# Patient Record
Sex: FEMALE | Race: WHITE | NOT HISPANIC OR LATINO | Employment: FULL TIME | URBAN - NONMETROPOLITAN AREA
[De-identification: names, ages, dates, MRNs, and addresses within clinical notes are randomized per-mention and may not be internally consistent; named-entity substitution may affect disease eponyms.]

---

## 2024-06-08 ENCOUNTER — HOSPITAL ENCOUNTER (EMERGENCY)
Facility: HOSPITAL | Age: 24
Discharge: HOME | End: 2024-06-08
Attending: SURGERY
Payer: COMMERCIAL

## 2024-06-08 VITALS
RESPIRATION RATE: 18 BRPM | WEIGHT: 280 LBS | OXYGEN SATURATION: 84 % | SYSTOLIC BLOOD PRESSURE: 118 MMHG | BODY MASS INDEX: 51.53 KG/M2 | TEMPERATURE: 99 F | HEIGHT: 62 IN | HEART RATE: 95 BPM | DIASTOLIC BLOOD PRESSURE: 92 MMHG

## 2024-06-08 DIAGNOSIS — O20.0 THREATENED MISCARRIAGE IN EARLY PREGNANCY: Primary | ICD-10-CM

## 2024-06-08 DIAGNOSIS — E66.01 MORBID OBESITY: ICD-10-CM

## 2024-06-08 LAB
ANION GAP SERPL CALC-SCNC: 8 MEQ/L (ref 2–13)
B-HCG FREE SERPL-ACNC: 25.98 MIU/ML
BACTERIA #/AREA URNS AUTO: ABNORMAL /HPF
BASOPHILS # BLD AUTO: 0.04 X10(3)/MCL (ref 0.01–0.08)
BASOPHILS NFR BLD AUTO: 0.4 % (ref 0.1–1.2)
BILIRUB UR QL STRIP.AUTO: ABNORMAL
BUN SERPL-MCNC: 12 MG/DL (ref 7–20)
CALCIUM SERPL-MCNC: 9.2 MG/DL (ref 8.4–10.2)
CHLORIDE SERPL-SCNC: 108 MMOL/L (ref 98–110)
CLARITY UR: ABNORMAL
CO2 SERPL-SCNC: 22 MMOL/L (ref 21–32)
COLOR UR AUTO: YELLOW
CREAT SERPL-MCNC: 0.78 MG/DL (ref 0.66–1.25)
CREAT/UREA NIT SERPL: 15 (ref 12–20)
EOSINOPHIL # BLD AUTO: 0.28 X10(3)/MCL (ref 0.04–0.36)
EOSINOPHIL NFR BLD AUTO: 3 % (ref 0.7–7)
ERYTHROCYTE [DISTWIDTH] IN BLOOD BY AUTOMATED COUNT: 15.9 % (ref 11–14.5)
GFR SERPLBLD CREATININE-BSD FMLA CKD-EPI: >90 ML/MIN/1.73/M2
GLUCOSE SERPL-MCNC: 100 MG/DL (ref 70–115)
GLUCOSE UR QL STRIP: NEGATIVE
HCT VFR BLD AUTO: 36.3 % (ref 36–48)
HGB BLD-MCNC: 11.1 G/DL (ref 11.8–16)
HGB UR QL STRIP: ABNORMAL
IMM GRANULOCYTES # BLD AUTO: 0.02 X10(3)/MCL (ref 0–0.03)
IMM GRANULOCYTES NFR BLD AUTO: 0.2 % (ref 0–0.5)
KETONES UR QL STRIP: NEGATIVE
LEUKOCYTE ESTERASE UR QL STRIP: NEGATIVE
LYMPHOCYTES # BLD AUTO: 2.06 X10(3)/MCL (ref 1.16–3.74)
LYMPHOCYTES NFR BLD AUTO: 21.8 % (ref 20–55)
MCH RBC QN AUTO: 24 PG (ref 27–34)
MCHC RBC AUTO-ENTMCNC: 30.6 G/DL (ref 31–37)
MCV RBC AUTO: 78.6 FL (ref 79–99)
MONOCYTES # BLD AUTO: 0.48 X10(3)/MCL (ref 0.24–0.36)
MONOCYTES NFR BLD AUTO: 5.1 % (ref 4.7–12.5)
NEUTROPHILS # BLD AUTO: 6.56 X10(3)/MCL (ref 1.56–6.13)
NEUTROPHILS NFR BLD AUTO: 69.5 % (ref 37–73)
NITRITE UR QL STRIP: NEGATIVE
NRBC BLD AUTO-RTO: 0 %
PH UR STRIP: 6 [PH]
PLATELET # BLD AUTO: 360 X10(3)/MCL (ref 140–371)
PMV BLD AUTO: 9.9 FL (ref 9.4–12.4)
POTASSIUM SERPL-SCNC: 3.7 MMOL/L (ref 3.5–5.1)
PROT UR QL STRIP: 30
RBC # BLD AUTO: 4.62 X10(6)/MCL (ref 4–5.1)
RBC #/AREA URNS AUTO: >100 /HPF
SODIUM SERPL-SCNC: 138 MMOL/L (ref 136–145)
SP GR UR STRIP.AUTO: >=1.03 (ref 1–1.03)
SQUAMOUS #/AREA URNS AUTO: ABNORMAL /HPF
TSH SERPL-ACNC: 1.59 UIU/ML (ref 0.36–3.74)
UROBILINOGEN UR STRIP-ACNC: 0.2
WBC # SPEC AUTO: 9.44 X10(3)/MCL (ref 4–11.5)
WBC #/AREA URNS AUTO: ABNORMAL /HPF

## 2024-06-08 PROCEDURE — 85025 COMPLETE CBC W/AUTO DIFF WBC: CPT | Performed by: SURGERY

## 2024-06-08 PROCEDURE — 80048 BASIC METABOLIC PNL TOTAL CA: CPT | Performed by: SURGERY

## 2024-06-08 PROCEDURE — 87086 URINE CULTURE/COLONY COUNT: CPT | Performed by: SURGERY

## 2024-06-08 PROCEDURE — 81015 MICROSCOPIC EXAM OF URINE: CPT | Performed by: SURGERY

## 2024-06-08 PROCEDURE — 84702 CHORIONIC GONADOTROPIN TEST: CPT | Performed by: SURGERY

## 2024-06-08 PROCEDURE — 81003 URINALYSIS AUTO W/O SCOPE: CPT | Performed by: SURGERY

## 2024-06-08 PROCEDURE — 99283 EMERGENCY DEPT VISIT LOW MDM: CPT

## 2024-06-08 PROCEDURE — 84443 ASSAY THYROID STIM HORMONE: CPT | Performed by: SURGERY

## 2024-06-09 NOTE — DISCHARGE INSTRUCTIONS
FOLLOW UP WITH PERSONAL PHYSICIAN OR OB/GYN AS SOON AS POSSIBLE.  RETURN TO ER IF SYMPTOMS INCREASE OR IF NEW SYMPTOMS DEVELOP.

## 2024-06-09 NOTE — ED PROVIDER NOTES
Encounter Date: 2024       History     Chief Complaint   Patient presents with    Vaginal Bleeding     Pt reports that she has been having spotting and bleeding throughout the day. OB is women[facility]. Reports she got a positive pregnancy test on  and has had one every day since.      22 YO MORBIDLY OBESE WF  PRESENTING W/ ACUTE ONSET ATRAUMATIC VAGINAL BLEEDING @ APPROX 5 WEEKS GESTATION.  +QUESTIONABLE POCs PRODUCED.  NO PRIOR ULTRASOUND.  INTERMITTENT CRAMPING.  NO Hx/o HTN.  NO Hx/o DM.  NO NV.  NO SOB.  NO CP.        Review of patient's allergies indicates:  No Known Allergies  Past Medical History:   Diagnosis Date    Anxiety disorder, unspecified     Depression      History reviewed. No pertinent surgical history.  No family history on file.     Review of Systems   All other systems reviewed and are negative.      Physical Exam     Initial Vitals   BP Pulse Resp Temp SpO2   24   (!) 174/102 96 18 99 °F (37.2 °C) 99 %      MAP       --                Physical Exam    Nursing note and vitals reviewed.  Constitutional: She appears well-developed and well-nourished. No distress.   HENT:   Head: Normocephalic and atraumatic.   Right Ear: External ear normal.   Left Ear: External ear normal.   Nose: Nose normal.   Mouth/Throat: Oropharynx is clear and moist.   Eyes: Conjunctivae and EOM are normal. Pupils are equal, round, and reactive to light. Right eye exhibits no discharge. Left eye exhibits no discharge.   Neck: Neck supple. No thyromegaly present. No JVD present.   Normal range of motion.  Cardiovascular:  Normal rate, regular rhythm, normal heart sounds and intact distal pulses.     Exam reveals no gallop.       No murmur heard.  Pulmonary/Chest: Breath sounds normal. No respiratory distress. She has no wheezes. She has no rhonchi. She has no rales.   Abdominal: Abdomen is soft. Bowel sounds are normal. She exhibits  mass. She exhibits no distension. There is no abdominal tenderness. There is no rebound and no guarding.   Musculoskeletal:         General: Normal range of motion.      Cervical back: Normal range of motion and neck supple.     Neurological: She is alert and oriented to person, place, and time. She has normal strength. No cranial nerve deficit or sensory deficit. GCS score is 15. GCS eye subscore is 4. GCS verbal subscore is 5. GCS motor subscore is 6.   Skin: Skin is warm. Capillary refill takes less than 2 seconds.   Psychiatric: She has a normal mood and affect. Her behavior is normal. Judgment and thought content normal.         ED Course   Procedures  Labs Reviewed   URINALYSIS, REFLEX TO URINE CULTURE - Abnormal; Notable for the following components:       Result Value    Appearance, UA SL CLOUDY (*)     Protein, UA 30 (*)     Blood, UA Large (*)     Bilirubin, UA Small (*)     All other components within normal limits    Narrative:      URINE STABILITY IS 2 HOURS AT ROOM TEMP OR    SIX HOURS REFRIGERATED. PERFORMING TESTING ON    SPECIMENS GREATER THAN THIS AGE MAY AFFECT THE    FOLLOWING TESTS:    PH          SPECIFIC GRAVITY           BLOOD    CLARITY     BILIRUBIN               UROBILINOGEN   CBC WITH DIFFERENTIAL - Abnormal; Notable for the following components:    Hgb 11.1 (*)     MCV 78.6 (*)     MCH 24.0 (*)     MCHC 30.6 (*)     RDW 15.9 (*)     Neut # 6.56 (*)     Mono # 0.48 (*)     All other components within normal limits   URINALYSIS, MICROSCOPIC - Abnormal; Notable for the following components:    Bacteria, UA Few (*)     RBC, UA >100 (*)     WBC, UA 6-10 (*)     All other components within normal limits   CULTURE, URINE   HCG, QUANTITATIVE    Narrative:     Beta-HCG ref range weeks after implantation (mIU/mL):   3-4wks 9-130   4-5wks    5-6wks [id number]   6-7wks [id number]   7-12wks [id number]   12-16wks [id number]   16-28wks 1400-41216   20-41wks [phone]   CBC W/ AUTO  DIFFERENTIAL    Narrative:     The following orders were created for panel order CBC Auto Differential.  Procedure                               Abnormality         Status                     ---------                               -----------         ------                     CBC with Differential[[id number]]       Abnormal            Final result                 Please view results for these tests on the individual orders.   BASIC METABOLIC PANEL   TSH          Imaging Results    None          Medications - No data to display  Medical Decision Making             ED Course as of 06/08/24 2112   Sat Jun 08, 2024 2107 Hemoglobin(!): 11.1 [WV]   2108 MCV(!): 78.6 [WV]   2108 MCH(!): 24.0 [WV]   2108 MCHC(!): 30.6 [WV]   2108 Specific Gravity,UA: >=1.030 [WV]   2108 RBC, UA(!): >100 [WV]   2108 WBC, UA(!): 6-10 [WV]   2111 Bp 118/92 [WV]      ED Course User Index  [WV] Santiago Perez MD                           Clinical Impression:  Final diagnoses:  [O20.0] Threatened miscarriage in early pregnancy (Primary)  [E66.01] Morbid obesity                 Santiago Perez MD  06/08/24 2113

## 2024-06-11 LAB — BACTERIA UR CULT: NO GROWTH

## 2024-09-27 NOTE — PROGRESS NOTES
Chief Complaint:  irregular cycles    History of Present Illness:  Cait Virk is a 24 y.o. year old  presents for her new ob. Unsure of LMP. She is doing well.         Gyn History:  Menstrual History  Cycle: Yes (2024)  Menarche Age: 12 years  Flow Duration: 5  Flow: (!) Heavy  Interval: 28  Intermenstrual Bleeding: No  Dysmenorrhea: Yes  Dysmenorrhea Severity : Moderate    Menopause  Menopause Age: 1 years    Pap History  Last pap date: 23  Result: Normal  History of Abnormal Pap: No  HPV Vaccine Completed: No (0/3)    [place]  Sexually Active: Yes  Sexual Orientation: heterosexual  Postcoital Bleeding: No  Dyspareunia: No  STI History: No  Contraception: No    Breast History  Last Breast Imaging Date: No  History of Breast Biopsy: No        Review of Systems:  General/Constitutional: Chills denies. Fatigue/weakness denies. Fever denies. Night sweats denies. Hot flashes denies.   Respiratory: Cough denies. Hemoptysis denies. SOB denies. Sputum production denies. Wheezing denies.   Cardiovascular: Chest pain denies. Dizziness denies. Palpitations denies. Swelling in hands/feet denies.    Gastrointestinal: Abdominal pain denies. Blood in stool denies. Constipation denies. Diarrhea denies. Heartburn denies. Nausea denies. Vomiting denies.   Genitourinary: Incontinence denies. Blood in urine denies. Frequent urination denies. Painful urination denies.  Urinary urgency denies.  Nocturia denies.   Gynecologic: Irregular menses denies. Heavy bleeding denies. Painful menses denies. Vaginal discharge denies. Vaginal odor denies. Vaginal itching denies. Vaginal lesion denies.  Pelvic pain denies. Decreased libido denies. Vulvar lesion denies. Prolapse of genital organs denies. Painful intercourse denies. Postcoital bleeding denies.   Psychiatric: Depression denies. Anxiety denies.     OB History    Para Term  AB Living   2 0 0 0 1 0   SAB IAB Ectopic Multiple Live Births   1 0 0 0 0     "  # 1 - Date: 24, Sex: None, Weight: None, GA: 6w0d, Type: Spontaneous , Apgar1: None, Apgar5: None, Living: None, Birth Comments: None    # 2 - Date: None, Sex: None, Weight: None, GA: None, Type: None, Apgar1: None, Apgar5: None, Living: None, Birth Comments: None    Past Medical History:   Diagnosis Date    Anemia     Anxiety disorder, unspecified     Depression      No current outpatient medications on file.    Review of patient's allergies indicates:  No Known Allergies    History reviewed. No pertinent surgical history.    Family History   Problem Relation Name Age of Onset    Heart failure Maternal Grandfather Wagner Quiñones         Had a heart attack in  still alive    Hypertension Father Quinn Virk     Migraines Mother Dinesh Quiñones         Constant migrianes on medication for them    Rashes / Skin problems Mother Dinesh Quiñones         Eczema    Thyroid disease Mother Dinesh Quiñones     Migraines Sister Viji Campbell         Constant migraines on medication for them    Heart failure Paternal Uncle Gabriel Virk         Had 3 heart attacks and  in  from the last one    Breast cancer Neg Hx      Colon cancer Neg Hx      Ovarian cancer Neg Hx      Uterine cancer Neg Hx       Social History     Socioeconomic History    Marital status: Single   Tobacco Use    Smoking status: Never     Passive exposure: Never    Smokeless tobacco: Never   Substance and Sexual Activity    Alcohol use: Never    Drug use: Never    Sexual activity: Yes     Partners: Male     Birth control/protection: None     Comment: Only ever been with my current partner. Previous depo shot       Physical Exam:  /84 (BP Location: Right arm, Patient Position: Sitting)   Temp 98.8 °F (37.1 °C) (Temporal)   Ht 5' 2" (1.575 m)   Wt (!) 138.7 kg (305 lb 12.8 oz)   LMP 2024 (Exact Date)   BMI 55.93 kg/m²       Chaperone present.       Constitutional: General appearance: healthy, well-nourished and " well-developed  Psychiatric: Orientation to time, place and person. Normal mood and affect and active, alert  Abdomen: Auscultation/Inspection/Palpation: NT, gravid    Female Genitalia:      Vulva: no masss, atrophy or lesions     Bladder/Urethra: no urethral discharge or mass, normal meatus, bladder non-distended     Vagina: no tenderness, erythema, cystocele, rectocele, abnormal vaginal discharge, or vesicle(s) or ulcers     Cervix: no discharge or cervical motion tenderness and grossly normal     Uterus: normal size and shape and midline, non-tender, and no uterine prolapse     Adnexa/Parametria: no parametrial tenderness or mass, no adnexal tenderness or ovarian mass         Assessment/Plan:  1. Anxiety  Educated  Counseling information given for Elaine Ventura [facility] [phone]  HI & SI precautions  Call if desires medication    2. BMI 50.0-59.9, adult  Obesity, especially class III obesity (BMI >= 40) is associated with numerous adverse pregnancy outcomes. These include but are not limited to miscarriage, poor ultrasound visualization, increase in congenital malformations (especially NTD's ),  birth, ascending infections, gestational diabetes, hypertensive diseases of pregnancy, macrosomia, shoulder dystocia, cerebral palsy and surgical complications such as wound infections, dehiscence and thrombosis. Macrosomia and the incidence of intrapartum complications related to macrosomia, such as shoulder dystocia, malpresentation, hemorrhage, and fourth degree laceration are also increased in obese gravidas. Obese women also have a high rate of  delivery and are more likely to have surgical, anesthetic, or postpartum complications.  birth in obese gravidas is primarily associated with obesity-related medical and  complications, rather than an intrinsic predisposition to spontaneous  labor.     Recommendations:  Recommended gestational weight gain of 11-20 pounds. Consider  dietary counseling.  Initiate low dose aspirin 81 mg daily for preeclampsia risk reduction at 12-16 weeks  Patients should receive adequate counseling regarding risk factors and possible difficulties with accurate labor monitoring,  delivery operative complications, and anesthesia.   With the increased risk of gestational diabetes, early 1st trimester screening is often advocated, however, there are no data demonstrating the effectiveness of this early screening in improving pregnancy outcomes.   Fetal growth assessment via fundal height monitoring is difficult in the setting of obesity. When the fundal height cannot be reliably followed or BMI >= 40, ultrasound evaluation of fetal growth is recommended at 32 weeks or sooner if other co-morbidities or indications exist.   Patients with a BMI of 40 or greater are at the highest risk for adverse pregnancy outcome, including stillbirth. Therefore, weekly  surveillance is recommended beginning at 34 weeks until delivery; other co-morbidities may require more frequent testing.  Consider Lovenox 40 mg BID for VTE prophylaxis while admitted to the hospital (antepartum or postpartum) if BMI >= 40.      1hr glucose, A1c    3. 10 weeks gestation of pregnancy  We discussed diet/supplements and modifiable risk factors.     Patients advised to continue moderate physical activity.       Patients will report unusual headaches, visual disturbances, pelvic pain or cramping, vaginal bleeding, rupture of membranes, extreme swelling in hands or face, diminished urine volume, any prolonged illness or infection, or persistent symptoms of labor.       +FHT   GA: unsure of LMP; 10w5d by US  ROBINA: 2025 by US  Pap w/ gc/cz/tv  PNL, PNV, folic acid  CMP, glucose 1hr, a1c  Desires Starr  Follow up 4 weeks      Educated on compliance of future appts  No future appointments.      This note was transcribed by Brisa Lundberg MA. There may be transcription errors as a result,  however minimal. I agree with transcription and every effort has been made to ensure accuracy of transcription, but any obvious errors or omissions should be clarified with the author of the document.

## 2024-09-30 ENCOUNTER — OFFICE VISIT (OUTPATIENT)
Dept: OBSTETRICS AND GYNECOLOGY | Facility: CLINIC | Age: 24
End: 2024-09-30
Payer: COMMERCIAL

## 2024-09-30 VITALS
HEIGHT: 62 IN | TEMPERATURE: 99 F | BODY MASS INDEX: 53.92 KG/M2 | SYSTOLIC BLOOD PRESSURE: 120 MMHG | DIASTOLIC BLOOD PRESSURE: 84 MMHG | WEIGHT: 293 LBS

## 2024-09-30 DIAGNOSIS — F41.9 ANXIETY: Primary | ICD-10-CM

## 2024-09-30 DIAGNOSIS — N91.5 OLIGOMENORRHEA, UNSPECIFIED TYPE: ICD-10-CM

## 2024-09-30 DIAGNOSIS — Z34.90 PREGNANCY, UNSPECIFIED GESTATIONAL AGE: ICD-10-CM

## 2024-09-30 DIAGNOSIS — Z3A.10 10 WEEKS GESTATION OF PREGNANCY: ICD-10-CM

## 2024-09-30 LAB
B-HCG UR QL: POSITIVE
CTP QC/QA: YES

## 2024-09-30 PROCEDURE — 87591 N.GONORRHOEAE DNA AMP PROB: CPT | Performed by: OBSTETRICS & GYNECOLOGY

## 2024-09-30 PROCEDURE — 87661 TRICHOMONAS VAGINALIS AMPLIF: CPT | Performed by: OBSTETRICS & GYNECOLOGY

## 2024-09-30 PROCEDURE — 87491 CHLMYD TRACH DNA AMP PROBE: CPT | Performed by: OBSTETRICS & GYNECOLOGY

## 2024-10-02 ENCOUNTER — LAB VISIT (OUTPATIENT)
Dept: LAB | Facility: HOSPITAL | Age: 24
End: 2024-10-02
Attending: OBSTETRICS & GYNECOLOGY
Payer: COMMERCIAL

## 2024-10-02 DIAGNOSIS — Z3A.10 10 WEEKS GESTATION OF PREGNANCY: ICD-10-CM

## 2024-10-02 DIAGNOSIS — Z34.90 PREGNANCY, UNSPECIFIED GESTATIONAL AGE: ICD-10-CM

## 2024-10-02 LAB
ALBUMIN SERPL-MCNC: 4.1 G/DL (ref 3.4–5)
ALBUMIN/GLOB SERPL: 1.4 RATIO
ALP SERPL-CCNC: 84 UNIT/L (ref 50–144)
ALT SERPL-CCNC: 16 UNIT/L (ref 1–45)
ANION GAP SERPL CALC-SCNC: 10 MEQ/L (ref 2–13)
AST SERPL-CCNC: 18 UNIT/L (ref 14–36)
BILIRUB SERPL-MCNC: 0.2 MG/DL (ref 0–1)
BUN SERPL-MCNC: 8 MG/DL (ref 7–20)
CALCIUM SERPL-MCNC: 10 MG/DL (ref 8.4–10.2)
CHLORIDE SERPL-SCNC: 105 MMOL/L (ref 98–110)
CO2 SERPL-SCNC: 21 MMOL/L (ref 21–32)
CREAT SERPL-MCNC: 0.59 MG/DL (ref 0.66–1.25)
CREAT/UREA NIT SERPL: 14 (ref 12–20)
ERYTHROCYTE [DISTWIDTH] IN BLOOD BY AUTOMATED COUNT: 17 % (ref 11–14.5)
EST. AVERAGE GLUCOSE BLD GHB EST-MCNC: 105.4 MG/DL (ref 70–115)
GFR SERPLBLD CREATININE-BSD FMLA CKD-EPI: >90 ML/MIN/1.73/M2
GLOBULIN SER-MCNC: 3 GM/DL (ref 2–3.9)
GLUCOSE SERPL-MCNC: 80 MG/DL (ref 70–115)
GROUP & RH: NORMAL
HBA1C MFR BLD: 5.3 % (ref 4–6)
HBV SURFACE AG SERPL QL IA: NEGATIVE
HBV SURFACE AG SERPL QL IA: NORMAL
HCT VFR BLD AUTO: 33.9 % (ref 36–48)
HGB BLD-MCNC: 10.5 G/DL (ref 11.8–16)
INDIRECT COOMBS: NORMAL
MCH RBC QN AUTO: 23.4 PG (ref 27–34)
MCHC RBC AUTO-ENTMCNC: 31 G/DL (ref 31–37)
MCV RBC AUTO: 75.7 FL (ref 79–99)
NRBC BLD AUTO-RTO: 0 %
PLATELET # BLD AUTO: 318 X10(3)/MCL (ref 140–371)
PMV BLD AUTO: 10.6 FL (ref 9.4–12.4)
POTASSIUM SERPL-SCNC: 4.3 MMOL/L (ref 3.5–5.1)
PROT SERPL-MCNC: 7.1 GM/DL (ref 6.3–8.2)
RBC # BLD AUTO: 4.48 X10(6)/MCL (ref 4–5.1)
RUBELLA AB, IGG (OLG): 25.8 IU/ML
SODIUM SERPL-SCNC: 136 MMOL/L (ref 136–145)
SPECIMEN OUTDATE: NORMAL
WBC # BLD AUTO: 10.23 X10(3)/MCL (ref 4–11.5)

## 2024-10-02 PROCEDURE — 86803 HEPATITIS C AB TEST: CPT

## 2024-10-02 PROCEDURE — 83036 HEMOGLOBIN GLYCOSYLATED A1C: CPT

## 2024-10-02 PROCEDURE — 36415 COLL VENOUS BLD VENIPUNCTURE: CPT

## 2024-10-02 PROCEDURE — 83020 HEMOGLOBIN ELECTROPHORESIS: CPT

## 2024-10-02 PROCEDURE — 80053 COMPREHEN METABOLIC PANEL: CPT

## 2024-10-02 PROCEDURE — 87340 HEPATITIS B SURFACE AG IA: CPT

## 2024-10-02 PROCEDURE — 86901 BLOOD TYPING SEROLOGIC RH(D): CPT | Performed by: OBSTETRICS & GYNECOLOGY

## 2024-10-02 PROCEDURE — 86900 BLOOD TYPING SEROLOGIC ABO: CPT | Performed by: OBSTETRICS & GYNECOLOGY

## 2024-10-02 PROCEDURE — 86780 TREPONEMA PALLIDUM: CPT

## 2024-10-02 PROCEDURE — 86762 RUBELLA ANTIBODY: CPT

## 2024-10-02 PROCEDURE — 86787 VARICELLA-ZOSTER ANTIBODY: CPT

## 2024-10-02 PROCEDURE — 87389 HIV-1 AG W/HIV-1&-2 AB AG IA: CPT

## 2024-10-02 PROCEDURE — 86850 RBC ANTIBODY SCREEN: CPT | Performed by: OBSTETRICS & GYNECOLOGY

## 2024-10-02 PROCEDURE — 85027 COMPLETE CBC AUTOMATED: CPT

## 2024-10-03 LAB
HCV AB SERPL QL IA: NONREACTIVE
HIV 1+2 AB+HIV1 P24 AG SERPL QL IA: NONREACTIVE
T PALLIDUM AB SER QL: NONREACTIVE
VZV IGG SER IA-ACNC: 0.7
VZV IGG SER QL IA: NEGATIVE

## 2024-10-04 LAB
HGB A MFR BLD ELPH: 97.7 % (ref 95.8–98)
HGB A2 MFR BLD ELPH: 2.3 % (ref 2–3.3)
HGB F MFR BLD ELPH: 0 % (ref 0–0.9)
HGB FRACT BLD ELPH-IMP: NORMAL
M HPLC HB VARIANT, B: NORMAL

## 2024-10-11 ENCOUNTER — LAB VISIT (OUTPATIENT)
Dept: LAB | Facility: HOSPITAL | Age: 24
End: 2024-10-11
Attending: OBSTETRICS & GYNECOLOGY
Payer: COMMERCIAL

## 2024-10-11 DIAGNOSIS — Z3A.10 10 WEEKS GESTATION OF PREGNANCY: ICD-10-CM

## 2024-10-11 LAB — GLUCOSE 1H P 100 G GLC PO SERPL-MCNC: 96 MG/DL (ref 100–180)

## 2024-10-11 PROCEDURE — 82950 GLUCOSE TEST: CPT

## 2024-10-28 ENCOUNTER — ROUTINE PRENATAL (OUTPATIENT)
Dept: OBSTETRICS AND GYNECOLOGY | Facility: CLINIC | Age: 24
End: 2024-10-28
Payer: COMMERCIAL

## 2024-10-28 VITALS — SYSTOLIC BLOOD PRESSURE: 124 MMHG | BODY MASS INDEX: 56 KG/M2 | DIASTOLIC BLOOD PRESSURE: 76 MMHG | WEIGHT: 293 LBS

## 2024-10-28 DIAGNOSIS — Z3A.14 14 WEEKS GESTATION OF PREGNANCY: ICD-10-CM

## 2024-10-28 DIAGNOSIS — F41.9 ANXIETY: Primary | ICD-10-CM

## 2024-10-28 LAB
BILIRUB UR QL STRIP: NORMAL
GLUCOSE UR QL STRIP: NEGATIVE
KETONES UR QL STRIP: NORMAL
LEUKOCYTE ESTERASE UR QL STRIP: NEGATIVE
PH, POC UA: NORMAL
POC BLOOD, URINE: NORMAL
POC NITRATES, URINE: NEGATIVE
PROT UR QL STRIP: NEGATIVE
SP GR UR STRIP: NORMAL (ref 1–1.03)
UROBILINOGEN UR STRIP-ACNC: NORMAL (ref 0.3–2.2)

## 2024-10-28 RX ORDER — ASPIRIN 81 MG/1
81 TABLET ORAL DAILY
Qty: 150 TABLET | Refills: 1 | Status: SHIPPED | OUTPATIENT
Start: 2024-10-28 | End: 2025-10-28

## 2024-11-06 ENCOUNTER — PATIENT MESSAGE (OUTPATIENT)
Dept: OTHER | Facility: OTHER | Age: 24
End: 2024-11-06
Payer: COMMERCIAL

## 2024-11-06 ENCOUNTER — TELEPHONE (OUTPATIENT)
Dept: OBSTETRICS AND GYNECOLOGY | Facility: CLINIC | Age: 24
End: 2024-11-06
Payer: COMMERCIAL

## 2024-11-06 NOTE — PROGRESS NOTES
Chief Complaint:  Routine Prenatal Visit    History of Present Illness:  Cait Virk is a 24 y.o. year old  at 16w1d presents for her ob exam. C/o increased anxiety and crying spells due to work. PMH of anxiety. Denies HI/SI. Able to care for self. Great family support.         Review of Systems:  General/Constitutional: Fever denies. Headache denies.     Skin: Rash denies.   Respiratory: Cough denies. SOB denies. Wheezing denies .   Cardiovascular: Chest pain denies. Dizziness denies. Palpitations denies. Swelling in hands/feet denies.    Gastrointestinal: Abdominal pain denies. Constipation denies. Diarrhea denies. Heartburn denies. Nausea denies. Vomiting denies.  Genitourinary: Painful urination denies.   Gynecologic: Vaginal bleeding denies. Vaginal discharge Normal. Leaking amniotic fluid denies.  Contractions denies.  Psychiatric: Depressed mood denies. Suicidal thoughts denies.       Current Outpatient Medications:     aspirin (ECOTRIN) 81 MG EC tablet, Take 1 tablet (81 mg total) by mouth once daily., Disp: 150 tablet, Rfl: 1    prenatal no115/iron/folic acid (PRENATAL 19 ORAL), Take by mouth., Disp: , Rfl:     busPIRone (BUSPAR) 7.5 MG tablet, Take 1 tablet (7.5 mg total) by mouth 2 (two) times a day., Disp: 60 tablet, Rfl: 2    hydrOXYzine pamoate (VISTARIL) 50 MG Cap, Take 1 capsule (50 mg total) by mouth every 6 (six) hours as needed (anxiety)., Disp: 30 capsule, Rfl: 1    Physical Exam:  /84   Wt (!) 137.4 kg (303 lb)   LMP 2024 (Exact Date)   BMI 55.42 kg/m²     Constitutional: General appearance: healthy, well-nourished and well-developed   Psychiatric:  Orientation to time, place and person. Normal affect and active, alert   Abdomen: Auscultation/Inspection/Palpation: Gravid. No tenderness or masses. Soft, nondistended   Extremities: no CCE    FHT: 150      Assessment/Plan  1. Anxiety in pregnancy  Educated  Counseling information given for Elaine Ventura L[facility]  [phone]  HI & SI precautions  Desires medication  Rx: Buspar 7.5 mg BID, Vistaril 50 mg q4-6hrs PRN for anxiety  Follow up 2 weeks    2. Anemia affecting pregnancy in second trimester  Anemia is defined by the WHO as hemoglobin less than 11 g/dL with the equivalent of lesser hematocrit of 33%. It is present in the about 30% of reproductive age females, and 40% of print individuals. The 2 most common causes of anemia in pregnancy are physiologic anemia of pregnancy and  iron-deficiency. Much less common causes of anemia include hemoglobinopathies (sickle cell, thalassemia), RBC membrane disorders (hereditary spherocytosis and had hereditary Elliptocytosis), and acquired anemias (folate deficiency, vitamin B12 deficiency, other vitamin deficiencies, autoimmune hemolytic anemia, hypothyroidism and chronic kidney disease). Anemia in pregnancy is associated with the twofold increase in  delivery and 3 fold increase in delivering a low birth weight baby.     Educated    Ferrous sulfate 325 mg twice daily, vitamin-C 250 mg twice daily, and folic acid 1 mg daily    Lab Results   Component Value Date    HGB 10.8 (L) 2024    HGB 10.5 (L) 10/02/2024    HCT 34.3 (L) 2024    HCT 33.9 (L) 10/02/2024     -     CBC Without Differential; Future; Expected date: 2024  -     Ferritin; Future; Expected date: 2024  -     Iron and TIBC; Future; Expected date: 2024    3. BMI 50.0-59.9, adult  Patient educated on risks and complications of obesity and pregnancy including but not limited to first trimester miscarriage recurrent miscarriages, congenital anomalies, HTN disorders, gestational diabetes, macrosomia, PTL&D, higher risk of fetal demise in-utero and higher risk of CS (and wound complication including infection breakdown) with obesity.     The [place] preventative task force placed the following recommendations in 2016 which ACOG supports regarding utilization of low dose 81mg ASA starting at 14  weeks and continuing through pregnancy in high risk pregnant women. The utilization of this has been shown to reduce the risk for preeclampsia by 24% in clinical trials and reduce the risk of  birth by 14% and IUGR about 20%.      Cont 81mg ASA     4. 16 weeks gestation of pregnancy  Instructed on weight gain, healthy exercise, vitamins, avoidance of drugs tobacco and alcohol. Pain, fever, bleeding precautions.        Discussed with patient travel precautions.  CBC, iron panel  MSAFP, keep MFM appt  Follow up 2 weeks for Buspar, Vistaril       Educated on compliance of future appts  Future Appointments   Date Time Provider Department Center   2024  8:00 AM Geri Noriega MD [facility]   2024 11:00 AM [facility]   2024 11:40 AM Regine Bhatt MD [facility] [place]       This note was transcribed by Brisa Lundberg MA. There may be transcription errors as a result, however minimal. I agree with transcription and every effort has been made to ensure accuracy of transcription, but any obvious errors or omissions should be clarified with the author of the document.

## 2024-11-06 NOTE — TELEPHONE ENCOUNTER
Patient called around 1400 c/o decrease fetal movement. Patient is currently 16.0 weeks. Counseled patient on fetal movement is not felt or is only occasional at 16 weeks. Patient insist she has felt movement every day and today she is not feeling movement. Spoke with Dr. Noriega.  Dr. Noriega offered an appointment for tomorrow.  Patient also advised to go to ER if she feels this is emergent.  Patient voiced understanding and agrees with plan of care.

## 2024-11-07 ENCOUNTER — ROUTINE PRENATAL (OUTPATIENT)
Dept: OBSTETRICS AND GYNECOLOGY | Facility: CLINIC | Age: 24
End: 2024-11-07
Payer: COMMERCIAL

## 2024-11-07 ENCOUNTER — LAB VISIT (OUTPATIENT)
Dept: LAB | Facility: HOSPITAL | Age: 24
End: 2024-11-07
Attending: OBSTETRICS & GYNECOLOGY
Payer: COMMERCIAL

## 2024-11-07 VITALS — DIASTOLIC BLOOD PRESSURE: 84 MMHG | WEIGHT: 293 LBS | BODY MASS INDEX: 55.42 KG/M2 | SYSTOLIC BLOOD PRESSURE: 126 MMHG

## 2024-11-07 DIAGNOSIS — O99.012 ANEMIA AFFECTING PREGNANCY IN SECOND TRIMESTER: ICD-10-CM

## 2024-11-07 DIAGNOSIS — O99.212 MATERNAL OBESITY SYNDROME IN SECOND TRIMESTER: ICD-10-CM

## 2024-11-07 DIAGNOSIS — E66.01 MORBID OBESITY WITH BMI OF 50.0-59.9, ADULT: Primary | ICD-10-CM

## 2024-11-07 DIAGNOSIS — Z3A.16 16 WEEKS GESTATION OF PREGNANCY: ICD-10-CM

## 2024-11-07 DIAGNOSIS — O99.340 ANXIETY IN PREGNANCY, ANTEPARTUM: ICD-10-CM

## 2024-11-07 DIAGNOSIS — F41.9 ANXIETY IN PREGNANCY, ANTEPARTUM: ICD-10-CM

## 2024-11-07 LAB
BILIRUB UR QL STRIP: NORMAL
ERYTHROCYTE [DISTWIDTH] IN BLOOD BY AUTOMATED COUNT: 18.3 % (ref 11–14.5)
FERRITIN SERPL-MCNC: 5.98 NG/ML (ref 6.24–264)
GLUCOSE UR QL STRIP: NEGATIVE
HCT VFR BLD AUTO: 34.3 % (ref 36–48)
HGB BLD-MCNC: 10.8 G/DL (ref 11.8–16)
KETONES UR QL STRIP: NORMAL
LEUKOCYTE ESTERASE UR QL STRIP: NEGATIVE
MCH RBC QN AUTO: 23.8 PG (ref 27–34)
MCHC RBC AUTO-ENTMCNC: 31.5 G/DL (ref 31–37)
MCV RBC AUTO: 75.7 FL (ref 79–99)
NRBC BLD AUTO-RTO: 0 %
PH, POC UA: NORMAL
PLATELET # BLD AUTO: 295 X10(3)/MCL (ref 140–371)
PMV BLD AUTO: 10.5 FL (ref 9.4–12.4)
POC BLOOD, URINE: NORMAL
POC NITRATES, URINE: NEGATIVE
PROT UR QL STRIP: NEGATIVE
RBC # BLD AUTO: 4.53 X10(6)/MCL (ref 4–5.1)
SP GR UR STRIP: NORMAL (ref 1–1.03)
UROBILINOGEN UR STRIP-ACNC: NORMAL (ref 0.3–2.2)
WBC # BLD AUTO: 10.59 X10(3)/MCL (ref 4–11.5)

## 2024-11-07 PROCEDURE — 82105 ALPHA-FETOPROTEIN SERUM: CPT

## 2024-11-07 PROCEDURE — 82728 ASSAY OF FERRITIN: CPT

## 2024-11-07 PROCEDURE — 85027 COMPLETE CBC AUTOMATED: CPT

## 2024-11-07 PROCEDURE — 83550 IRON BINDING TEST: CPT

## 2024-11-07 PROCEDURE — 36415 COLL VENOUS BLD VENIPUNCTURE: CPT

## 2024-11-07 RX ORDER — HYDROXYZINE PAMOATE 50 MG/1
50 CAPSULE ORAL EVERY 6 HOURS PRN
Qty: 30 CAPSULE | Refills: 1 | Status: SHIPPED | OUTPATIENT
Start: 2024-11-07

## 2024-11-07 RX ORDER — BUSPIRONE HYDROCHLORIDE 7.5 MG/1
7.5 TABLET ORAL 2 TIMES DAILY
Qty: 60 TABLET | Refills: 2 | Status: SHIPPED | OUTPATIENT
Start: 2024-11-07 | End: 2025-11-07

## 2024-11-08 LAB
IRON SATN MFR SERPL: 17 % (ref 20–50)
IRON SERPL-MCNC: 50 UG/DL (ref 50–170)
TIBC SERPL-MCNC: 250 UG/DL (ref 70–310)
TIBC SERPL-MCNC: 300 UG/DL (ref 250–450)
TRANSFERRIN SERPL-MCNC: 267 MG/DL (ref 180–382)

## 2024-11-11 LAB — MAYO GENERIC ORDERABLE RESULT: NORMAL

## 2024-11-13 ENCOUNTER — PATIENT MESSAGE (OUTPATIENT)
Dept: OTHER | Facility: OTHER | Age: 24
End: 2024-11-13
Payer: COMMERCIAL

## 2024-11-25 NOTE — PROGRESS NOTES
Chief Complaint:  Routine Prenatal Visit    History of Present Illness:  Cait Virk is a 24 y.o. year old  at 18w6d presents to follow up initiation of Buspar 7.5 mg BID and Vistaril 25 mg PRN on 24 secondary to anxiety. Reports improvement however still has baseline anxiety. Denies HI/SI. Great family support.        Review of Systems:  General/Constitutional: Fever denies. Headache denies.     Skin: Rash denies.   Respiratory: Cough denies. SOB denies. Wheezing denies .   Cardiovascular: Chest pain denies. Dizziness denies. Palpitations denies. Swelling in hands/feet denies.    Gastrointestinal: Abdominal pain denies. Constipation denies. Diarrhea denies. Heartburn denies. Nausea denies. Vomiting denies.  Genitourinary: Painful urination denies.   Gynecologic: Vaginal bleeding denies. Vaginal discharge Normal. Leaking amniotic fluid denies.  Contractions denies.  Psychiatric: Depressed mood denies. Suicidal thoughts denies.       Current Outpatient Medications:     aspirin (ECOTRIN) 81 MG EC tablet, Take 1 tablet (81 mg total) by mouth once daily., Disp: 150 tablet, Rfl: 1    hydrOXYzine pamoate (VISTARIL) 50 MG Cap, Take 1 capsule (50 mg total) by mouth every 6 (six) hours as needed (anxiety)., Disp: 30 capsule, Rfl: 1    prenatal no115/iron/folic acid (PRENATAL 19 ORAL), Take by mouth., Disp: , Rfl:     busPIRone (BUSPAR) 10 MG tablet, Take 1 tablet (10 mg total) by mouth 2 (two) times daily., Disp: 60 tablet, Rfl: 1    Physical Exam:  /72   Wt (!) 139 kg (306 lb 6.4 oz)   LMP 2024 (Exact Date)   BMI 56.04 kg/m²     Constitutional: General appearance: healthy, well-nourished and well-developed   Psychiatric:  Orientation to time, place and person. Normal mood and affect and active, alert   Abdomen: Auscultation/Inspection/Palpation: Gravid. No tenderness or masses. Soft, nondistended   Extremities: no CCE    FHT: 130      Assessment/Plan  1. Anxiety in pregnancy,  antepartum  Educated  Counseling information given for Elaine Ventura [facility] [phone]  HI & SI precautions  Improvement however still has symptoms  Increase Buspar to 10 mg BID  Follow up 3 weeks    2. Anemia affecting pregnancy in second trimester  Anemia is defined by the WHO as hemoglobin less than 11 g/dL with the equivalent of lesser hematocrit of 33%. It is present in the about 30% of reproductive age females, and 40% of print individuals. The 2 most common causes of anemia in pregnancy are physiologic anemia of pregnancy and  iron-deficiency. Much less common causes of anemia include hemoglobinopathies (sickle cell, thalassemia), RBC membrane disorders (hereditary spherocytosis and had hereditary Elliptocytosis), and acquired anemias (folate deficiency, vitamin B12 deficiency, other vitamin deficiencies, autoimmune hemolytic anemia, hypothyroidism and chronic kidney disease). Anemia in pregnancy is associated with the twofold increase in  delivery and 3 fold increase in delivering a low birth weight baby.     Educated    Ferrous sulfate 325 mg twice daily, vitamin-C 250 mg twice daily, and folic acid 1 mg daily    24  Iron: 50  TIBC: 300  Ferritin: 5.98    Lab Results   Component Value Date    HGB 10.8 (L) 2024    HGB 10.5 (L) 10/02/2024    HCT 34.3 (L) 2024    HCT 33.9 (L) 10/02/2024       3. BMI 50.0-59.9, adult  Patient educated on risks and complications of obesity and pregnancy including but not limited to first trimester miscarriage recurrent miscarriages, congenital anomalies, HTN disorders, gestational diabetes, macrosomia, PTL&D, higher risk of fetal demise in-utero and higher risk of CS (and wound complication including infection breakdown) with obesity.     The [place] preventative task force placed the following recommendations in 2016 which ACOG supports regarding utilization of low dose 81mg ASA starting at 14 weeks and continuing through pregnancy in high risk pregnant  women. The utilization of this has been shown to reduce the risk for preeclampsia by 24% in clinical trials and reduce the risk of  birth by 14% and IUGR about 20%.      Cont 81mg ASA     4. 18 weeks gestation of pregnancy  Instructed on weight gain, healthy exercise, vitamins, avoidance of drugs tobacco and alcohol. Pain, fever, bleeding precautions.        Discussed with patient travel precautions.  Keep [facility] appt  Follow up 3 weeks for Buspar increase       Educated on compliance of future appts  Future Appointments   Date Time Provider Department Center   2024 11:00 AM ROOM 2, [facility] [place] [facility] [place]   2024 11:40 AM Regine Bhatt MD [facility]       This note was transcribed by Brisa uLndberg MA. There may be transcription errors as a result, however minimal. I agree with transcription and every effort has been made to ensure accuracy of transcription, but any obvious errors or omissions should be clarified with the author of the document.

## 2024-11-26 ENCOUNTER — ROUTINE PRENATAL (OUTPATIENT)
Dept: OBSTETRICS AND GYNECOLOGY | Facility: CLINIC | Age: 24
End: 2024-11-26
Payer: COMMERCIAL

## 2024-11-26 VITALS — BODY MASS INDEX: 56.04 KG/M2 | SYSTOLIC BLOOD PRESSURE: 124 MMHG | DIASTOLIC BLOOD PRESSURE: 72 MMHG | WEIGHT: 293 LBS

## 2024-11-26 DIAGNOSIS — O99.012 ANEMIA AFFECTING PREGNANCY IN SECOND TRIMESTER: ICD-10-CM

## 2024-11-26 DIAGNOSIS — Z3A.18 18 WEEKS GESTATION OF PREGNANCY: ICD-10-CM

## 2024-11-26 DIAGNOSIS — O99.340 ANXIETY IN PREGNANCY, ANTEPARTUM: Primary | ICD-10-CM

## 2024-11-26 DIAGNOSIS — F41.9 ANXIETY IN PREGNANCY, ANTEPARTUM: Primary | ICD-10-CM

## 2024-11-26 RX ORDER — BUSPIRONE HYDROCHLORIDE 10 MG/1
10 TABLET ORAL 2 TIMES DAILY
Qty: 60 TABLET | Refills: 1 | Status: SHIPPED | OUTPATIENT
Start: 2024-11-26 | End: 2025-11-26

## 2024-12-04 ENCOUNTER — PATIENT MESSAGE (OUTPATIENT)
Dept: OTHER | Facility: OTHER | Age: 24
End: 2024-12-04
Payer: COMMERCIAL

## 2024-12-11 DIAGNOSIS — E66.01 MORBID OBESITY WITH BMI OF 50.0-59.9, ADULT: Primary | ICD-10-CM

## 2024-12-11 DIAGNOSIS — O99.212 MATERNAL OBESITY SYNDROME IN SECOND TRIMESTER: ICD-10-CM

## 2024-12-13 NOTE — PROGRESS NOTES
"Chief Complaint:  Routine Prenatal Visit (21.6 weeks)    History of Present Illness:  Cait Virk is a 24 y.o. year old  at 21w6d presents to follow up Buspar increase to 10 mg BID. States crying spells have increased with the 10 mg, desires to go back to the 7.5 mg. Her mood is overall stable. Denies HI/SI. Able to care for self.         Review of Systems:  General/Constitutional: Fever denies. Headache denies.     Skin: Rash denies.   Respiratory: Cough denies. SOB denies. Wheezing denies .   Cardiovascular: Chest pain denies. Dizziness denies. Palpitations denies. Swelling in hands/feet denies.    Gastrointestinal: Abdominal pain denies. Constipation denies. Diarrhea denies. Heartburn denies. Nausea denies. Vomiting denies.  Genitourinary: Painful urination denies.   Gynecologic: Vaginal bleeding denies. Vaginal discharge Normal. Leaking amniotic fluid denies.  Contractions denies.  Psychiatric: Depressed mood denies. Suicidal thoughts denies.       Current Outpatient Medications:     aspirin (ECOTRIN) 81 MG EC tablet, Take 1 tablet (81 mg total) by mouth once daily., Disp: 150 tablet, Rfl: 1    busPIRone (BUSPAR) 10 MG tablet, Take 1 tablet (10 mg total) by mouth 2 (two) times daily., Disp: 60 tablet, Rfl: 1    hydrOXYzine pamoate (VISTARIL) 50 MG Cap, Take 1 capsule (50 mg total) by mouth every 6 (six) hours as needed (anxiety)., Disp: 30 capsule, Rfl: 1    prenatal no115/iron/folic acid (PRENATAL 19 ORAL), Take by mouth., Disp: , Rfl:     Physical Exam:  /86 (BP Location: Left arm, Patient Position: Sitting)   Temp 97.3 °F (36.3 °C) (Temporal)   Ht 5' 2" (1.575 m)   Wt (!) 137.9 kg (304 lb)   LMP 2024 (Exact Date)   BMI 55.60 kg/m²       Constitutional: General appearance: healthy, well-nourished and well-developed   Psychiatric:  Orientation to time, place and person. Normal mood and affect and active, alert   Abdomen: Auscultation/Inspection/Palpation: Gravid. No tenderness or " masses. Soft, nondistended   Extremities: no CCE    FHT: 150      Assessment/Plan  1. Anxiety in pregnancy, antepartum  Educated  Crying spells increased with Buspar 10 mg, desires to restart Buspar 7.5 mg  Ok to start the Buspar 7.5 mg  Counseling information given for Elaine Ventura [phone]  HI & SI precautions  Advised to call if symptoms persist     2. Anemia affecting pregnancy in second trimester  Anemia is defined by the WHO as hemoglobin less than 11 g/dL with the equivalent of lesser hematocrit of 33%. It is present in the about 30% of reproductive age females, and 40% of print individuals. The 2 most common causes of anemia in pregnancy are physiologic anemia of pregnancy and  iron-deficiency. Much less common causes of anemia include hemoglobinopathies (sickle cell, thalassemia), RBC membrane disorders (hereditary spherocytosis and had hereditary Elliptocytosis), and acquired anemias (folate deficiency, vitamin B12 deficiency, other vitamin deficiencies, autoimmune hemolytic anemia, hypothyroidism and chronic kidney disease). Anemia in pregnancy is associated with the twofold increase in  delivery and 3 fold increase in delivering a low birth weight baby.     Educated    Ferrous sulfate 325 mg twice daily, vitamin-C 250 mg twice daily, and folic acid 1 mg daily     24  Iron: 50  TIBC: 300  Ferritin: 5.98     Lab Results   Component Value Date    HGB 10.8 (L) 2024    HGB 10.5 (L) 10/02/2024    HCT 34.3 (L) 2024    HCT 33.9 (L) 10/02/2024       3. Obesity in pregnancy, antepartum  Patient educated on risks and complications of obesity and pregnancy including but not limited to first trimester miscarriage recurrent miscarriages, congenital anomalies, HTN disorders, gestational diabetes, macrosomia, PTL&D, higher risk of fetal demise in-utero and higher risk of CS (and wound complication including infection breakdown) with obesity.     The [place] preventative task force placed  the following recommendations in 2016 which ACOG supports regarding utilization of low dose 81mg ASA starting at 14 weeks and continuing through pregnancy in high risk pregnant women. The utilization of this has been shown to reduce the risk for preeclampsia by 24% in clinical trials and reduce the risk of  birth by 14% and IUGR about 20%.      Cont 81mg ASA  Brisa contacted Carisa Torres NP referred to nutrition services      Recommendations:  TWG goal is 11-20 lbs  Screen for signs/symptoms of obstructive sleep apnea  Nutritionist consult offered (this is to be ordered by primary OB provider)  Consider early 1 hr GCT (passed); repeat at 24-28 weeks gestation  Continue low dose aspirin 81 mg daily at 12-16 weeks for preeclampsia risk reduction  Targeted anatomical survey scheduled at 18-20 weeks - started today, some views limited  Fetal growth ultrasound at 32 weeks if BMI >= 40  Weekly  testing at 32 weeks if pre-pregnancy BMI >= 45  Lovenox 40 mg BID for VTE prophylaxis while admitted to the hospital (antepartum or postpartum) if BMI >= 40.   Encouraged breastfeeding  Postpartum lifestyle modifications & weight loss     4. Pyelectasis of fetus on prenatal ultrasound  Mild left sided renal pelvic dilation measuring 6.6 mm was noted consistent with UTDA1. The kidneys appear normal as does the bladder. She has a low risk cfDNA and this is a male fetus.     5. 21 weeks gestation of pregnancy  Instructed on weight gain, healthy exercise, vitamins, avoidance of drugs tobacco and alcohol. Pain, fever, bleeding precautions.        Discussed with patient travel precautions.  RTC 5 weeks       Educated on compliance of future appts  Future Appointments   Date Time Provider Department Center   2025 10:20 AM ROOM 2, [facility]   2025 11:00 AM Regine Bhatt MD [facility] Shefali Perez   2025  8:50 AM Geri Noriega MD [facility]       This note was  transcribed by Brisa Lundberg MA. There may be transcription errors as a result, however minimal. I agree with transcription and every effort has been made to ensure accuracy of transcription, but any obvious errors or omissions should be clarified with the author of the document.

## 2024-12-16 ENCOUNTER — OFFICE VISIT (OUTPATIENT)
Dept: MATERNAL FETAL MEDICINE | Facility: CLINIC | Age: 24
End: 2024-12-16
Payer: COMMERCIAL

## 2024-12-16 ENCOUNTER — PROCEDURE VISIT (OUTPATIENT)
Dept: MATERNAL FETAL MEDICINE | Facility: CLINIC | Age: 24
End: 2024-12-16
Payer: COMMERCIAL

## 2024-12-16 VITALS
BODY MASS INDEX: 53.92 KG/M2 | DIASTOLIC BLOOD PRESSURE: 73 MMHG | WEIGHT: 293 LBS | SYSTOLIC BLOOD PRESSURE: 106 MMHG | HEIGHT: 62 IN | HEART RATE: 121 BPM

## 2024-12-16 DIAGNOSIS — O99.212 MATERNAL OBESITY SYNDROME IN SECOND TRIMESTER: ICD-10-CM

## 2024-12-16 DIAGNOSIS — Z3A.14 14 WEEKS GESTATION OF PREGNANCY: ICD-10-CM

## 2024-12-16 DIAGNOSIS — E66.01 SEVERE OBESITY DUE TO EXCESS CALORIES AFFECTING PREGNANCY IN SECOND TRIMESTER: Primary | ICD-10-CM

## 2024-12-16 DIAGNOSIS — O35.EXX0 PYELECTASIS OF FETUS ON PRENATAL ULTRASOUND: ICD-10-CM

## 2024-12-16 DIAGNOSIS — E66.01 MORBID OBESITY WITH BMI OF 50.0-59.9, ADULT: ICD-10-CM

## 2024-12-16 DIAGNOSIS — O99.342 MENTAL DISORDER AFFECTING PREGNANCY IN SECOND TRIMESTER: ICD-10-CM

## 2024-12-16 DIAGNOSIS — O99.212 SEVERE OBESITY DUE TO EXCESS CALORIES AFFECTING PREGNANCY IN SECOND TRIMESTER: Primary | ICD-10-CM

## 2024-12-16 PROCEDURE — 1160F RVW MEDS BY RX/DR IN RCRD: CPT | Mod: CPTII,S$GLB,, | Performed by: OBSTETRICS & GYNECOLOGY

## 2024-12-16 PROCEDURE — 3044F HG A1C LEVEL LT 7.0%: CPT | Mod: CPTII,S$GLB,, | Performed by: OBSTETRICS & GYNECOLOGY

## 2024-12-16 PROCEDURE — 1159F MED LIST DOCD IN RCRD: CPT | Mod: CPTII,S$GLB,, | Performed by: OBSTETRICS & GYNECOLOGY

## 2024-12-16 PROCEDURE — 99214 OFFICE O/P EST MOD 30 MIN: CPT | Mod: S$GLB,,, | Performed by: OBSTETRICS & GYNECOLOGY

## 2024-12-16 PROCEDURE — 3074F SYST BP LT 130 MM HG: CPT | Mod: CPTII,S$GLB,, | Performed by: OBSTETRICS & GYNECOLOGY

## 2024-12-16 PROCEDURE — 3008F BODY MASS INDEX DOCD: CPT | Mod: CPTII,S$GLB,, | Performed by: OBSTETRICS & GYNECOLOGY

## 2024-12-16 PROCEDURE — 76811 OB US DETAILED SNGL FETUS: CPT | Mod: S$GLB,,, | Performed by: OBSTETRICS & GYNECOLOGY

## 2024-12-16 PROCEDURE — 3078F DIAST BP <80 MM HG: CPT | Mod: CPTII,S$GLB,, | Performed by: OBSTETRICS & GYNECOLOGY

## 2024-12-16 NOTE — ASSESSMENT & PLAN NOTE
She reports a history of anxiety. She takes Buspar BID. She reports stable symptoms. Discussed increased risk for peripartum and postpartum mood disorders. Precautions provided.      It is important to optimize mental health conditions during pregnancy in an effort to reduce the risk of postpartum mood disorders. There are options for management including psychotherapy, counseling, cognitive behavioral therapy, exercise/yoga, journaling, and psychiatry services.

## 2024-12-16 NOTE — ASSESSMENT & PLAN NOTE
Mild left sided renal pelvic dilation measuring 6.6 mm was noted consistent with UTDA1. The kidneys appear normal as does the bladder. She has a low risk cfDNA and this is a male fetus.     The finding of mild renal pelvis dilation was discussed with the patient. We reviewed basic anatomy of the renal collecting system and then discussed possible etiologies for renal pelvis dilation. When dilation of the renal pelvis is borderline or mild, most cases will resolve spontaneously. However, if the renal dilation persists in the  period, the most common conditions that can cause renal pelvis dilation are UPJ obstruction (ureteropelvic junction), UVJ obstruction (ureterovesical junction) and VUR (vesicoureteral reflux). These conditions predispose infants/children to urinary tract infection, but can be effectively followed and treated. I explained that the vast majority of infants with these findings will have spontaneous resolution during pregnancy. We will plan to re-evaluate the kidneys at her next office visit.

## 2024-12-16 NOTE — ASSESSMENT & PLAN NOTE
There are  risks associated with obesity which include and increased risk of hypertension, preeclampsia, gestational diabetes, venous thromboembolic disease,  delivery, macrosomia (with resultant shoulder dystocia, obstetric sphincter injury), IUFD, longer first stage of labor, decreased TOLAC success rate, emergent & scheduled  & complications of  (prolonged OR time, delayed delivery, excessive EBL, infection, wound separation/infection, higher spinal failure rate, more difficult intubation).  Obesity is also associated with fetal anomalies, specifically neural tube defects.  Studies have shown that the rate of complication increases with rising BMI and thus pregnancies with maternal morbid obesity are at increased risk.      BMI 56    Recommendations:  TWG goal is 11-20 lbs  Screen for signs/symptoms of obstructive sleep apnea  Nutritionist consult offered (this is to be ordered by primary OB provider)  Consider early 1 hr GCT (passed); repeat at 24-28 weeks gestation  Continue low dose aspirin 81 mg daily at 12-16 weeks for preeclampsia risk reduction  Targeted anatomical survey scheduled at 18-20 weeks - started today, some views limited  Fetal growth ultrasound at 32 weeks if BMI >= 40  Weekly  testing at 32 weeks if pre-pregnancy BMI >= 45  Lovenox 40 mg BID for VTE prophylaxis while admitted to the hospital (antepartum or postpartum) if BMI >= 40.   Encouraged breastfeeding  Postpartum lifestyle modifications & weight loss

## 2024-12-16 NOTE — PROGRESS NOTES
MATERNAL-FETAL MEDICINE   CONSULT NOTE    Provider requesting consultation: Dr Noriega    SUBJECTIVE:     Ms. Cait Virk is a 24 y.o.  female with IUP at 21w5d who is seen in consultation by MFM for evaluation and management of:  Problem   -BMI affecting pregnancy in second trimester   -Anxiety affecting pregnancy in second trimester   - Pyelectasis of fetus on prenatal ultrasound     Cait presents for consultation due to her BMI. Passed early glucola, baseline A1C 5.3. She is taking a low dose aspirin daily.  She has a history of anxiety and depression. She takes Buspar BID. She reports improved symptoms with this medication.  NIPT 46, XY       Medication List with Changes/Refills   Current Medications    ASPIRIN (ECOTRIN) 81 MG EC TABLET    Take 1 tablet (81 mg total) by mouth once daily.    BUSPIRONE (BUSPAR) 10 MG TABLET    Take 1 tablet (10 mg total) by mouth 2 (two) times daily.    HYDROXYZINE PAMOATE (VISTARIL) 50 MG CAP    Take 1 capsule (50 mg total) by mouth every 6 (six) hours as needed (anxiety).    PRENATAL /IRON/FOLIC ACID (PRENATAL 19 ORAL)    Take by mouth.       Review of patient's allergies indicates:  No Known Allergies    PMH:  Past Medical History:   Diagnosis Date    Anemia     Anxiety disorder, unspecified     Depression        PObHx:  OB History    Para Term  AB Living   2       1     SAB IAB Ectopic Multiple Live Births   1              # Outcome Date GA Lbr Magdy/2nd Weight Sex Type Anes PTL Lv   2 Current            1 SAB 24 6w0d    SAB          PSH:History reviewed. No pertinent surgical history.    Family history:family history includes Heart failure in her maternal grandfather and paternal uncle; Hypertension in her father; Migraines in her mother and sister; Rashes / Skin problems in her mother; Thyroid disease in her mother.    Social history: reports that she has never smoked. She has never been exposed to tobacco smoke. She has never used smokeless  "tobacco. She reports that she does not drink alcohol and does not use drugs.    Genetic history: The patient denies any inherited genetic diseases or birth defects in herself or her partner's personal history or family.    Objective:   /73 (BP Location: Left forearm, Patient Position: Sitting)   Pulse (!) 121   Ht 5' 2" (1.575 m)   Wt (!) 138.6 kg (305 lb 10.7 oz)   LMP 2024 (Exact Date)   BMI 55.91 kg/m²     Ultrasound performed. See viewpoint for full ultrasound report.    A detailed fetal anatomic ultrasound examination was performed for the following high risk indication: Pyelectasis, BMI.   No fetal structural malformations are identified; however, fetal imaging is incomplete today as noted above secondary to habitus and positioning. Left fetal renal pelvis dilation is noted measuring 6.6mm (UTDA1).   A follow-up study will be scheduled to complete the fetal anatomic survey.   Fetal size today is consistent with established gestational age (66%).   Cervical length by TA scanning is normal.   Placental location is posterior without evidence of previa.     ASSESSMENT/PLAN:     24 y.o.  female with IUP at 21w5d     -BMI affecting pregnancy in second trimester  There are  risks associated with obesity which include and increased risk of hypertension, preeclampsia, gestational diabetes, venous thromboembolic disease,  delivery, macrosomia (with resultant shoulder dystocia, obstetric sphincter injury), IUFD, longer first stage of labor, decreased TOLAC success rate, emergent & scheduled  & complications of  (prolonged OR time, delayed delivery, excessive EBL, infection, wound separation/infection, higher spinal failure rate, more difficult intubation).  Obesity is also associated with fetal anomalies, specifically neural tube defects.  Studies have shown that the rate of complication increases with rising BMI and thus pregnancies with maternal morbid obesity are " at increased risk.      BMI 56    Recommendations:  TWG goal is 11-20 lbs  Screen for signs/symptoms of obstructive sleep apnea  Nutritionist consult offered (this is to be ordered by primary OB provider)  Consider early 1 hr GCT (passed); repeat at 24-28 weeks gestation  Continue low dose aspirin 81 mg daily at 12-16 weeks for preeclampsia risk reduction  Targeted anatomical survey scheduled at 18-20 weeks - started today, some views limited  Fetal growth ultrasound at 32 weeks if BMI >= 40  Weekly  testing at 32 weeks if pre-pregnancy BMI >= 45  Lovenox 40 mg BID for VTE prophylaxis while admitted to the hospital (antepartum or postpartum) if BMI >= 40.   Encouraged breastfeeding  Postpartum lifestyle modifications & weight loss      -Anxiety affecting pregnancy in second trimester  She reports a history of anxiety. She takes Buspar BID. She reports stable symptoms. Discussed increased risk for peripartum and postpartum mood disorders. Precautions provided.      It is important to optimize mental health conditions during pregnancy in an effort to reduce the risk of postpartum mood disorders. There are options for management including psychotherapy, counseling, cognitive behavioral therapy, exercise/yoga, journaling, and psychiatry services.       - Pyelectasis of fetus on prenatal ultrasound  Mild left sided renal pelvic dilation measuring 6.6 mm was noted consistent with UTDA1. The kidneys appear normal as does the bladder. She has a low risk cfDNA and this is a male fetus.     The finding of mild renal pelvis dilation was discussed with the patient. We reviewed basic anatomy of the renal collecting system and then discussed possible etiologies for renal pelvis dilation. When dilation of the renal pelvis is borderline or mild, most cases will resolve spontaneously. However, if the renal dilation persists in the  period, the most common conditions that can cause renal pelvis dilation are UPJ  obstruction (ureteropelvic junction), UVJ obstruction (ureterovesical junction) and VUR (vesicoureteral reflux). These conditions predispose infants/children to urinary tract infection, but can be effectively followed and treated. I explained that the vast majority of infants with these findings will have spontaneous resolution during pregnancy. We will plan to re-evaluate the kidneys at her next office visit.         FOLLOW UP:   F/u in 4 weeks for US/MFM visit      This consultation was completed with the assistance of Carisa Torres NP.      Regine Bhatt MD  Maternal Fetal Medicine

## 2024-12-17 ENCOUNTER — ROUTINE PRENATAL (OUTPATIENT)
Dept: OBSTETRICS AND GYNECOLOGY | Facility: CLINIC | Age: 24
End: 2024-12-17
Payer: COMMERCIAL

## 2024-12-17 VITALS
BODY MASS INDEX: 53.92 KG/M2 | WEIGHT: 293 LBS | SYSTOLIC BLOOD PRESSURE: 128 MMHG | HEIGHT: 62 IN | DIASTOLIC BLOOD PRESSURE: 86 MMHG | TEMPERATURE: 97 F

## 2024-12-17 DIAGNOSIS — O99.340 ANXIETY IN PREGNANCY, ANTEPARTUM: Primary | ICD-10-CM

## 2024-12-17 DIAGNOSIS — F41.9 ANXIETY IN PREGNANCY, ANTEPARTUM: Primary | ICD-10-CM

## 2024-12-17 DIAGNOSIS — O99.212 SEVERE OBESITY DUE TO EXCESS CALORIES AFFECTING PREGNANCY IN SECOND TRIMESTER: ICD-10-CM

## 2024-12-17 DIAGNOSIS — E66.01 SEVERE OBESITY DUE TO EXCESS CALORIES AFFECTING PREGNANCY IN SECOND TRIMESTER: ICD-10-CM

## 2024-12-17 DIAGNOSIS — O35.EXX0 PYELECTASIS OF FETUS ON PRENATAL ULTRASOUND: ICD-10-CM

## 2024-12-17 DIAGNOSIS — O99.012 ANEMIA AFFECTING PREGNANCY IN SECOND TRIMESTER: ICD-10-CM

## 2024-12-17 DIAGNOSIS — Z3A.21 21 WEEKS GESTATION OF PREGNANCY: ICD-10-CM

## 2024-12-17 DIAGNOSIS — O99.210 OBESITY IN PREGNANCY, ANTEPARTUM: ICD-10-CM

## 2024-12-17 LAB
BILIRUB UR QL STRIP: ABNORMAL
GLUCOSE UR QL STRIP: NEGATIVE
KETONES UR QL STRIP: ABNORMAL
LEUKOCYTE ESTERASE UR QL STRIP: POSITIVE
PH, POC UA: ABNORMAL
POC BLOOD, URINE: ABNORMAL
POC NITRATES, URINE: NEGATIVE
PROT UR QL STRIP: POSITIVE
SP GR UR STRIP: ABNORMAL (ref 1–1.03)
UROBILINOGEN UR STRIP-ACNC: ABNORMAL (ref 0.3–2.2)

## 2024-12-17 PROCEDURE — 0502F SUBSEQUENT PRENATAL CARE: CPT | Mod: CPTII,,, | Performed by: OBSTETRICS & GYNECOLOGY

## 2024-12-17 RX ORDER — BUSPIRONE HYDROCHLORIDE 7.5 MG/1
7.5 TABLET ORAL 2 TIMES DAILY
Qty: 60 TABLET | Refills: 5 | Status: SHIPPED | OUTPATIENT
Start: 2024-12-17 | End: 2025-12-17

## 2025-01-01 ENCOUNTER — PATIENT MESSAGE (OUTPATIENT)
Dept: OTHER | Facility: OTHER | Age: 25
End: 2025-01-01
Payer: COMMERCIAL

## 2025-01-13 ENCOUNTER — APPOINTMENT (OUTPATIENT)
Dept: LAB | Facility: HOSPITAL | Age: 25
End: 2025-01-13
Attending: NURSE PRACTITIONER
Payer: COMMERCIAL

## 2025-01-13 ENCOUNTER — PROCEDURE VISIT (OUTPATIENT)
Dept: MATERNAL FETAL MEDICINE | Facility: CLINIC | Age: 25
End: 2025-01-13
Payer: COMMERCIAL

## 2025-01-13 ENCOUNTER — OFFICE VISIT (OUTPATIENT)
Dept: MATERNAL FETAL MEDICINE | Facility: CLINIC | Age: 25
End: 2025-01-13
Payer: COMMERCIAL

## 2025-01-13 VITALS
HEIGHT: 62 IN | SYSTOLIC BLOOD PRESSURE: 146 MMHG | WEIGHT: 293 LBS | HEART RATE: 95 BPM | DIASTOLIC BLOOD PRESSURE: 82 MMHG | BODY MASS INDEX: 53.92 KG/M2

## 2025-01-13 DIAGNOSIS — E66.01 SEVERE OBESITY DUE TO EXCESS CALORIES AFFECTING PREGNANCY IN SECOND TRIMESTER: Primary | ICD-10-CM

## 2025-01-13 DIAGNOSIS — E66.01 SEVERE OBESITY DUE TO EXCESS CALORIES AFFECTING PREGNANCY IN SECOND TRIMESTER: ICD-10-CM

## 2025-01-13 DIAGNOSIS — O35.EXX0 PYELECTASIS OF FETUS ON PRENATAL ULTRASOUND: ICD-10-CM

## 2025-01-13 DIAGNOSIS — O99.212 SEVERE OBESITY DUE TO EXCESS CALORIES AFFECTING PREGNANCY IN SECOND TRIMESTER: Primary | ICD-10-CM

## 2025-01-13 DIAGNOSIS — O99.212 SEVERE OBESITY DUE TO EXCESS CALORIES AFFECTING PREGNANCY IN SECOND TRIMESTER: ICD-10-CM

## 2025-01-13 DIAGNOSIS — R03.0 ELEVATED BLOOD PRESSURE READING IN OFFICE WITHOUT DIAGNOSIS OF HYPERTENSION: ICD-10-CM

## 2025-01-13 LAB
ALBUMIN SERPL-MCNC: 2.5 G/DL (ref 3.5–5)
ALBUMIN/GLOB SERPL: 0.6 RATIO (ref 1.1–2)
ALP SERPL-CCNC: 145 UNIT/L (ref 40–150)
ALT SERPL-CCNC: 10 UNIT/L (ref 0–55)
ANION GAP SERPL CALC-SCNC: 7 MEQ/L
AST SERPL-CCNC: 8 UNIT/L (ref 5–34)
BASOPHILS # BLD AUTO: 0.02 X10(3)/MCL
BASOPHILS NFR BLD AUTO: 0.2 %
BILIRUB SERPL-MCNC: 0.2 MG/DL
BUN SERPL-MCNC: 6.1 MG/DL (ref 7–18.7)
CALCIUM SERPL-MCNC: 9.3 MG/DL (ref 8.4–10.2)
CHLORIDE SERPL-SCNC: 106 MMOL/L (ref 98–107)
CO2 SERPL-SCNC: 24 MMOL/L (ref 22–29)
CREAT SERPL-MCNC: 0.61 MG/DL (ref 0.55–1.02)
CREAT UR-MCNC: 139.6 MG/DL (ref 45–106)
CREAT/UREA NIT SERPL: 10
EOSINOPHIL # BLD AUTO: 0.16 X10(3)/MCL (ref 0–0.9)
EOSINOPHIL NFR BLD AUTO: 1.4 %
ERYTHROCYTE [DISTWIDTH] IN BLOOD BY AUTOMATED COUNT: 18.6 % (ref 11.5–17)
GFR SERPLBLD CREATININE-BSD FMLA CKD-EPI: >60 ML/MIN/1.73/M2
GLOBULIN SER-MCNC: 4.2 GM/DL (ref 2.4–3.5)
GLUCOSE SERPL-MCNC: 86 MG/DL (ref 74–100)
HCT VFR BLD AUTO: 31.2 % (ref 37–47)
HGB BLD-MCNC: 9.4 G/DL (ref 12–16)
IMM GRANULOCYTES # BLD AUTO: 0.11 X10(3)/MCL (ref 0–0.04)
IMM GRANULOCYTES NFR BLD AUTO: 1 %
LDH SERPL-CCNC: 179 U/L (ref 125–220)
LYMPHOCYTES # BLD AUTO: 1.51 X10(3)/MCL (ref 0.6–4.6)
LYMPHOCYTES NFR BLD AUTO: 13.2 %
MCH RBC QN AUTO: 24.2 PG (ref 27–31)
MCHC RBC AUTO-ENTMCNC: 30.1 G/DL (ref 33–36)
MCV RBC AUTO: 80.4 FL (ref 80–94)
MONOCYTES # BLD AUTO: 0.54 X10(3)/MCL (ref 0.1–1.3)
MONOCYTES NFR BLD AUTO: 4.7 %
NEUTROPHILS # BLD AUTO: 9.09 X10(3)/MCL (ref 2.1–9.2)
NEUTROPHILS NFR BLD AUTO: 79.5 %
NRBC BLD AUTO-RTO: 0 %
PLATELET # BLD AUTO: 272 X10(3)/MCL (ref 130–400)
PMV BLD AUTO: 10.6 FL (ref 7.4–10.4)
POTASSIUM SERPL-SCNC: 3.9 MMOL/L (ref 3.5–5.1)
PROT SERPL-MCNC: 6.7 GM/DL (ref 6.4–8.3)
PROT UR STRIP-MCNC: 11.8 MG/DL
RBC # BLD AUTO: 3.88 X10(6)/MCL (ref 4.2–5.4)
SODIUM SERPL-SCNC: 137 MMOL/L (ref 136–145)
URATE SERPL-MCNC: 3.6 MG/DL (ref 2.6–6)
URINE PROTEIN/CREATININE RATIO (OLG): 0.1
WBC # BLD AUTO: 11.43 X10(3)/MCL (ref 4.5–11.5)

## 2025-01-13 PROCEDURE — 1160F RVW MEDS BY RX/DR IN RCRD: CPT | Mod: CPTII,S$GLB,, | Performed by: OBSTETRICS & GYNECOLOGY

## 2025-01-13 PROCEDURE — 3079F DIAST BP 80-89 MM HG: CPT | Mod: CPTII,S$GLB,, | Performed by: OBSTETRICS & GYNECOLOGY

## 2025-01-13 PROCEDURE — 99214 OFFICE O/P EST MOD 30 MIN: CPT | Mod: S$GLB,,, | Performed by: OBSTETRICS & GYNECOLOGY

## 2025-01-13 PROCEDURE — 36415 COLL VENOUS BLD VENIPUNCTURE: CPT | Performed by: OBSTETRICS & GYNECOLOGY

## 2025-01-13 PROCEDURE — 3008F BODY MASS INDEX DOCD: CPT | Mod: CPTII,S$GLB,, | Performed by: OBSTETRICS & GYNECOLOGY

## 2025-01-13 PROCEDURE — 76816 OB US FOLLOW-UP PER FETUS: CPT | Mod: S$GLB,,, | Performed by: OBSTETRICS & GYNECOLOGY

## 2025-01-13 PROCEDURE — 1159F MED LIST DOCD IN RCRD: CPT | Mod: CPTII,S$GLB,, | Performed by: OBSTETRICS & GYNECOLOGY

## 2025-01-13 PROCEDURE — 3077F SYST BP >= 140 MM HG: CPT | Mod: CPTII,S$GLB,, | Performed by: OBSTETRICS & GYNECOLOGY

## 2025-01-13 RX ORDER — ACETAMINOPHEN 500 MG
500 TABLET ORAL EVERY 6 HOURS PRN
COMMUNITY

## 2025-01-13 NOTE — ASSESSMENT & PLAN NOTE
Mild left sided renal pelvic dilation measuring 6.6 mm was noted consistent with UTDA1. The kidneys appear normal as does the bladder. She has a low risk cfDNA and this is a male fetus.     The finding of mild renal pelvis dilation was discussed with the patient. We reviewed basic anatomy of the renal collecting system and then discussed possible etiologies for renal pelvis dilation. When dilation of the renal pelvis is borderline or mild, most cases will resolve spontaneously. However, if the renal dilation persists in the  period, the most common conditions that can cause renal pelvis dilation are UPJ obstruction (ureteropelvic junction), UVJ obstruction (ureterovesical junction) and VUR (vesicoureteral reflux). These conditions predispose infants/children to urinary tract infection, but can be effectively followed and treated. I explained that the vast majority of infants with these findings will have spontaneous resolution during pregnancy. We will plan to re-evaluate the kidneys at her next office visit.     25- Persistent mild pyelectasis (now bilateral L-6mm, R-4.3mm). AFV normal.

## 2025-01-13 NOTE — ASSESSMENT & PLAN NOTE
Today, she had mildly elevated blood pressures (147/86 and 146/82). She is asymptomatic. Pre-eclampsia labs ordered outpatient

## 2025-01-13 NOTE — PROGRESS NOTES
"Maternal Fetal Medicine follow up consult    SUBJECTIVE:     Cait Virk is a 24 y.o.  female with IUP at 25w5d who is seen in follow up consultation by MFM.  Pregnancy complications include:   Problem   - Elevated blood pressure reading in office without diagnosis of hypertension   -BMI affecting pregnancy in second trimester   - Pyelectasis of fetus on prenatal ultrasound     Cait presents for routine follow up appointment.  Initial /86. Repeat 146/82. Asymptomatic. No edema, HA or vision changes.  Denies LOF, VB, contractions. Positive fetal movement.    Previous notes reviewed.   No changes to medical, surgical, family, social, or obstetric history.  Interval history since last MFM visit: see above  Medications reviewed.    Care team members:  Dr Noriega - Primary OB     OBJECTIVE:   BP (!) 146/82 (BP Location: Right forearm, Patient Position: Sitting)   Pulse 95   Ht 5' 2" (1.575 m)   Wt (!) 140.2 kg (309 lb 1.4 oz)   LMP 2024 (Exact Date)   BMI 56.53 kg/m²     Ultrasound performed. See viewpoint for full ultrasound report.    A viable cortes pregnancy is visualized in cephalic presentation.  Estimated fetal weight is at the 79th percentile with an abdominal circumference at the 94nd percentile.  Bilateral renal pelvis dilation is noted measuring 6mm on the left and 4.3mm on the right (UTDA1). Some views remain suboptimal. Amniotic fluid volume is normal.  Placenta is posterior.     ASSESSMENT/PLAN:     24 y.o.  female with IUP at 25w5d    -BMI affecting pregnancy in second trimester  Please see prior documentation for specific counseling.      BMI 57    Recommendations:  TWG goal is 11-20 lbs  Screen for signs/symptoms of obstructive sleep apnea  Nutritionist consult offered (this is to be ordered by primary OB provider)  Consider early 1 hr GCT (passed); repeat at 24-28 weeks gestation  Continue low dose aspirin 81 mg daily at 12-16 weeks for preeclampsia risk " reduction  Targeted anatomical survey scheduled at 18-20 weeks - completed  Fetal growth ultrasound at 32 weeks if BMI >= 40  Weekly  testing at 32 weeks if pre-pregnancy BMI >= 45  Lovenox 40 mg BID for VTE prophylaxis while admitted to the hospital (antepartum or postpartum) if BMI >= 40.   Encouraged breastfeeding  Postpartum lifestyle modifications & weight loss      - Pyelectasis of fetus on prenatal ultrasound  Mild left sided renal pelvic dilation measuring 6.6 mm was noted consistent with UTDA1. The kidneys appear normal as does the bladder. She has a low risk cfDNA and this is a male fetus.     The finding of mild renal pelvis dilation was discussed with the patient. We reviewed basic anatomy of the renal collecting system and then discussed possible etiologies for renal pelvis dilation. When dilation of the renal pelvis is borderline or mild, most cases will resolve spontaneously. However, if the renal dilation persists in the  period, the most common conditions that can cause renal pelvis dilation are UPJ obstruction (ureteropelvic junction), UVJ obstruction (ureterovesical junction) and VUR (vesicoureteral reflux). These conditions predispose infants/children to urinary tract infection, but can be effectively followed and treated. I explained that the vast majority of infants with these findings will have spontaneous resolution during pregnancy. We will plan to re-evaluate the kidneys at her next office visit.     25- Persistent mild pyelectasis (now bilateral L-6mm, R-4.3mm). AFV normal.    - Elevated blood pressure reading in office without diagnosis of hypertension  Today, she had mildly elevated blood pressures (147/86 and 146/82). She is asymptomatic. Pre-eclampsia labs ordered outpatient    F/u in 4 weeks for MFM visit /growth ultrasound    Regine Bhatt MD  Maternal Fetal Medicine

## 2025-01-13 NOTE — ASSESSMENT & PLAN NOTE
Please see prior documentation for specific counseling.      BMI 57    Recommendations:  TWG goal is 11-20 lbs  Screen for signs/symptoms of obstructive sleep apnea  Nutritionist consult offered (this is to be ordered by primary OB provider)  Consider early 1 hr GCT (passed); repeat at 24-28 weeks gestation  Continue low dose aspirin 81 mg daily at 12-16 weeks for preeclampsia risk reduction  Targeted anatomical survey scheduled at 18-20 weeks - completed  Fetal growth ultrasound at 32 weeks if BMI >= 40  Weekly  testing at 32 weeks if pre-pregnancy BMI >= 45  Lovenox 40 mg BID for VTE prophylaxis while admitted to the hospital (antepartum or postpartum) if BMI >= 40.   Encouraged breastfeeding  Postpartum lifestyle modifications & weight loss

## 2025-01-13 NOTE — PROGRESS NOTES
"Maternal Fetal Medicine follow up consult    SUBJECTIVE:     Cait Virk is a 24 y.o.  female with IUP at 25w5d who is seen in follow up consultation by MFM.  Pregnancy complications include:   Problem   -BMI affecting pregnancy in second trimester   - Pyelectasis of fetus on prenatal ultrasound     Cait presents for routine follow up appointment.  Initial BP elevated (147/86). Repeat BP  Denies LOF, VB, contractions. Positive fetal movement.    Previous notes reviewed.   No changes to medical, surgical, family, social, or obstetric history.  Interval history since last MFM visit: see above  Medications reviewed.    Care team members:  Dr Noriega - Primary OB     OBJECTIVE:   BP (!) 147/86 (BP Location: Right forearm, Patient Position: Sitting)   Pulse 101   Ht 5' 2" (1.575 m)   Wt (!) 140.2 kg (309 lb 1.4 oz)   LMP 2024 (Exact Date)   BMI 56.53 kg/m²     Ultrasound performed. See viewpoint for full ultrasound report.    ASSESSMENT/PLAN:     24 y.o.  female with IUP at 25w5d    -BMI affecting pregnancy in second trimester  Please see prior documentation for specific counseling.      BMI 57    Recommendations:  TWG goal is 11-20 lbs  Screen for signs/symptoms of obstructive sleep apnea  Nutritionist consult offered (this is to be ordered by primary OB provider)  Consider early 1 hr GCT (passed); repeat at 24-28 weeks gestation  Continue low dose aspirin 81 mg daily at 12-16 weeks for preeclampsia risk reduction  Targeted anatomical survey scheduled at 18-20 weeks - completed  Fetal growth ultrasound at 32 weeks if BMI >= 40  Weekly  testing at 32 weeks if pre-pregnancy BMI >= 45  Lovenox 40 mg BID for VTE prophylaxis while admitted to the hospital (antepartum or postpartum) if BMI >= 40.   Encouraged breastfeeding  Postpartum lifestyle modifications & weight loss      - Pyelectasis of fetus on prenatal ultrasound  Mild left sided renal pelvic dilation measuring 6.6 mm was noted " consistent with UTDA1. The kidneys appear normal as does the bladder. She has a low risk cfDNA and this is a male fetus.     The finding of mild renal pelvis dilation was discussed with the patient. We reviewed basic anatomy of the renal collecting system and then discussed possible etiologies for renal pelvis dilation. When dilation of the renal pelvis is borderline or mild, most cases will resolve spontaneously. However, if the renal dilation persists in the  period, the most common conditions that can cause renal pelvis dilation are UPJ obstruction (ureteropelvic junction), UVJ obstruction (ureterovesical junction) and VUR (vesicoureteral reflux). These conditions predispose infants/children to urinary tract infection, but can be effectively followed and treated. I explained that the vast majority of infants with these findings will have spontaneous resolution during pregnancy. We will plan to re-evaluate the kidneys at her next office visit.     25- Persistent mild pyelectasis (6mm). AFV normal.    F/u in 6 weeks for MFM visit /growth ultrasound

## 2025-01-14 DIAGNOSIS — E66.01 SEVERE OBESITY DUE TO EXCESS CALORIES AFFECTING PREGNANCY IN SECOND TRIMESTER: Primary | ICD-10-CM

## 2025-01-14 DIAGNOSIS — O35.EXX0 PYELECTASIS OF FETUS ON PRENATAL ULTRASOUND: ICD-10-CM

## 2025-01-14 DIAGNOSIS — O99.212 SEVERE OBESITY DUE TO EXCESS CALORIES AFFECTING PREGNANCY IN SECOND TRIMESTER: Primary | ICD-10-CM

## 2025-01-15 ENCOUNTER — PATIENT MESSAGE (OUTPATIENT)
Dept: OTHER | Facility: OTHER | Age: 25
End: 2025-01-15
Payer: COMMERCIAL

## 2025-01-24 NOTE — PROGRESS NOTES
"Chief Complaint:  Routine Prenatal Visit (27.5 weeks)    History of Present Illness:  Cait Virk is a 24 y.o. year old  at 27w5d presents for her ob exam. She is doing well. +fetal movement. Denies vaginal bleeding, vaginal discharge, LOF or contractions. Negative preeclampsia ROS.        Review of Systems:  General/Constitutional: Fever denies. Headache denies.    Skin: Rash denies.   Respiratory: Cough denies. SOB denies. Wheezing denies .   Cardiovascular: Chest pain denies. Dizziness denies. Palpitations denies. Swelling in hands/feet denies.    Gastrointestinal: Abdominal pain denies. Constipation denies. Diarrhea denies. Heartburn denies. Nausea denies. Vomiting denies.    Genitourinary: Painful urination denies.   Gynecologic: Vaginal bleeding denies. Vaginal discharge denies. Leaking amniotic fluid denies. Contractions denies.    Psychiatric: Depressed mood denies. Suicidal thoughts denies.       Current Outpatient Medications:     acetaminophen (TYLENOL) 500 MG tablet, Take 500 mg by mouth every 6 (six) hours as needed for Pain., Disp: , Rfl:     aspirin (ECOTRIN) 81 MG EC tablet, Take 1 tablet (81 mg total) by mouth once daily., Disp: 150 tablet, Rfl: 1    hydrOXYzine pamoate (VISTARIL) 50 MG Cap, Take 1 capsule (50 mg total) by mouth every 6 (six) hours as needed (anxiety)., Disp: 30 capsule, Rfl: 1    prenatal no115/iron/folic acid (PRENATAL 19 ORAL), Take by mouth., Disp: , Rfl:     busPIRone (BUSPAR) 7.5 MG tablet, Take 1 tablet (7.5 mg total) by mouth 2 (two) times a day., Disp: 60 tablet, Rfl: 5  No current facility-administered medications for this visit.      Physical Exam:  /78 (BP Location: Right forearm, Patient Position: Sitting)   Temp 97.7 °F (36.5 °C) (Temporal)   Ht 5' 2" (1.575 m)   Wt (!) 140.6 kg (310 lb)   LMP 2024 (Exact Date)   BMI 56.70 kg/m²       Constitutional: General appearance: healthy, well-nourished and well-developed   Psychiatric:  Orientation to " time, place and person. Normal mood and affect and active, alert   Abdomen: Auscultation/Inspection/Palpation: Gravid. No tenderness or masses. Soft, nondistended   Extremities: no CCE      FH: n/a due to   FHT: 140      Assessment/Plan  1. Anemia affecting pregnancy in second trimester  Anemia is defined by the WHO as hemoglobin less than 11 g/dL with the equivalent of lesser hematocrit of 33%. It is present in the about 30% of reproductive age females, and 40% of print individuals. The 2 most common causes of anemia in pregnancy are physiologic anemia of pregnancy and  iron-deficiency. Much less common causes of anemia include hemoglobinopathies (sickle cell, thalassemia), RBC membrane disorders (hereditary spherocytosis and had hereditary Elliptocytosis), and acquired anemias (folate deficiency, vitamin B12 deficiency, other vitamin deficiencies, autoimmune hemolytic anemia, hypothyroidism and chronic kidney disease). Anemia in pregnancy is associated with the twofold increase in  delivery and 3 fold increase in delivering a low birth weight baby.     Educated    Ferrous sulfate 325 mg twice daily, vitamin-C 250 mg twice daily, and folic acid 1 mg daily     24  Iron: 50  TIBC: 300  Ferritin: 5.98     Lab Results   Component Value Date    HGB 9.4 (L) 2025    HGB 10.8 (L) 2024    HCT 31.2 (L) 2025    HCT 34.3 (L) 2024       2. Obesity in pregnancy, antepartum  Patient educated on risks and complications of obesity and pregnancy including but not limited to first trimester miscarriage recurrent miscarriages, congenital anomalies, HTN disorders, gestational diabetes, macrosomia, PTL&D, higher risk of fetal demise in-utero and higher risk of CS (and wound complication including infection breakdown) with obesity.     The [place] preventative task force placed the following recommendations in 2016 which ACOG supports regarding utilization of low dose 81mg ASA starting at 14 weeks and  continuing through pregnancy in high risk pregnant women. The utilization of this has been shown to reduce the risk for preeclampsia by 24% in clinical trials and reduce the risk of  birth by 14% and IUGR about 20%.      Cont 81mg ASA     Recommendations:  TWG goal is 11-20 lbs  Screen for signs/symptoms of obstructive sleep apnea  Nutritionist consult offered (this is to be ordered by primary OB provider)  Consider early 1 hr GCT (passed); repeat at 24-28 weeks gestation  Continue low dose aspirin 81 mg daily at 12-16 weeks for preeclampsia risk reduction  Targeted anatomical survey scheduled at 18-20 weeks - started today, some views limited  Fetal growth ultrasound at 32 weeks if BMI >= 40  Weekly  testing at 32 weeks if pre-pregnancy BMI >= 45  Lovenox 40 mg BID for VTE prophylaxis while admitted to the hospital (antepartum or postpartum) if BMI >= 40.   Encouraged breastfeeding  Postpartum lifestyle modifications & weight loss     3. - Pyelectasis of fetus on prenatal ultrasound  Mild left sided renal pelvic dilation measuring 6.6 mm was noted consistent with UTDA1. The kidneys appear normal as does the bladder. She has a low risk cfDNA and this is a male fetus.     4. Anxiety in pregnancy, antepartum  Educated  Controlled w/ Buspar 7.5 mg, refills sent out  Counseling information given for [facility] [phone]  HI & SI precautions  Advised to call if symptoms persist     5. 27 weeks gestation of pregnancy  Discussed that should any of the following symptoms occur during pregnancy, patient should contact the office immediately or go to the ER after business hours: spotting or bleeding, cramping, painful urination, severe vomiting, pain in one leg or calf, sudden gush of fluid, decrease or absence of fetal movement, sudden weight gain, periorbital or facial swelling, headache with visual changes and/or photophobia.       Discussed with patient travel precautions.  Offered tdap today,  administered   RPR, zohaib, iron panel  RTC 3 weeks      Educated on compliance of future appts  Future Appointments   Date Time Provider Department Center   2/10/2025  3:20 PM [facility]   2/10/2025  4:00 PM [facility]   2/19/2025  7:50 AM Geri Noriega MD [facility]       This note was transcribed by Brisa Lundberg MA. There may be transcription errors as a result, however minimal. I agree with transcription and every effort has been made to ensure accuracy of transcription, but any obvious errors or omissions should be clarified with the author of the document.

## 2025-01-27 ENCOUNTER — ROUTINE PRENATAL (OUTPATIENT)
Dept: OBSTETRICS AND GYNECOLOGY | Facility: CLINIC | Age: 25
End: 2025-01-27
Payer: COMMERCIAL

## 2025-01-27 VITALS
BODY MASS INDEX: 53.92 KG/M2 | DIASTOLIC BLOOD PRESSURE: 78 MMHG | WEIGHT: 293 LBS | SYSTOLIC BLOOD PRESSURE: 130 MMHG | HEIGHT: 62 IN | TEMPERATURE: 98 F

## 2025-01-27 DIAGNOSIS — F41.9 ANXIETY IN PREGNANCY, ANTEPARTUM: ICD-10-CM

## 2025-01-27 DIAGNOSIS — O99.012 ANEMIA AFFECTING PREGNANCY IN SECOND TRIMESTER: Primary | ICD-10-CM

## 2025-01-27 DIAGNOSIS — Z23 IMMUNIZATION DUE: ICD-10-CM

## 2025-01-27 DIAGNOSIS — O99.340 ANXIETY IN PREGNANCY, ANTEPARTUM: ICD-10-CM

## 2025-01-27 DIAGNOSIS — O35.EXX0 PYELECTASIS OF FETUS ON PRENATAL ULTRASOUND: ICD-10-CM

## 2025-01-27 DIAGNOSIS — O99.210 OBESITY IN PREGNANCY, ANTEPARTUM: ICD-10-CM

## 2025-01-27 DIAGNOSIS — Z3A.27 27 WEEKS GESTATION OF PREGNANCY: ICD-10-CM

## 2025-01-27 PROCEDURE — 90715 TDAP VACCINE 7 YRS/> IM: CPT | Mod: ,,, | Performed by: OBSTETRICS & GYNECOLOGY

## 2025-01-27 PROCEDURE — 0502F SUBSEQUENT PRENATAL CARE: CPT | Mod: CPTII,,, | Performed by: OBSTETRICS & GYNECOLOGY

## 2025-01-27 PROCEDURE — 90471 IMMUNIZATION ADMIN: CPT | Mod: ,,, | Performed by: OBSTETRICS & GYNECOLOGY

## 2025-01-27 RX ORDER — BUSPIRONE HYDROCHLORIDE 7.5 MG/1
7.5 TABLET ORAL 2 TIMES DAILY
Qty: 60 TABLET | Refills: 5 | Status: SHIPPED | OUTPATIENT
Start: 2025-01-27 | End: 2026-01-27

## 2025-01-29 ENCOUNTER — PATIENT MESSAGE (OUTPATIENT)
Dept: OTHER | Facility: OTHER | Age: 25
End: 2025-01-29
Payer: COMMERCIAL

## 2025-01-29 ENCOUNTER — HOSPITAL ENCOUNTER (OUTPATIENT)
Facility: HOSPITAL | Age: 25
Discharge: HOME | End: 2025-01-29
Attending: OBSTETRICS & GYNECOLOGY | Admitting: OBSTETRICS & GYNECOLOGY
Payer: COMMERCIAL

## 2025-01-29 VITALS — SYSTOLIC BLOOD PRESSURE: 129 MMHG | HEART RATE: 89 BPM | DIASTOLIC BLOOD PRESSURE: 57 MMHG

## 2025-01-29 DIAGNOSIS — O46.90 VAGINAL BLEEDING DURING PREGNANCY: ICD-10-CM

## 2025-01-29 LAB
BACTERIA #/AREA URNS AUTO: ABNORMAL /HPF
BILIRUB UR QL STRIP.AUTO: NEGATIVE
CLARITY UR: CLEAR
COLOR UR AUTO: YELLOW
GLUCOSE UR QL STRIP: NEGATIVE
HGB UR QL STRIP: NEGATIVE
KETONES UR QL STRIP: NEGATIVE
LEUKOCYTE ESTERASE UR QL STRIP: ABNORMAL
NITRITE UR QL STRIP: POSITIVE
PH UR STRIP: 6 [PH]
PROT UR QL STRIP: NEGATIVE
RBC #/AREA URNS AUTO: ABNORMAL /HPF
SP GR UR STRIP.AUTO: 1.01 (ref 1–1.03)
SQUAMOUS #/AREA URNS AUTO: ABNORMAL /HPF
UROBILINOGEN UR STRIP-ACNC: 0.2
WBC #/AREA URNS AUTO: ABNORMAL /HPF

## 2025-01-29 PROCEDURE — 87661 TRICHOMONAS VAGINALIS AMPLIF: CPT | Performed by: OBSTETRICS & GYNECOLOGY

## 2025-01-29 PROCEDURE — 81003 URINALYSIS AUTO W/O SCOPE: CPT | Performed by: OBSTETRICS & GYNECOLOGY

## 2025-01-29 PROCEDURE — 87086 URINE CULTURE/COLONY COUNT: CPT | Performed by: OBSTETRICS & GYNECOLOGY

## 2025-01-29 PROCEDURE — 87591 N.GONORRHOEAE DNA AMP PROB: CPT | Performed by: OBSTETRICS & GYNECOLOGY

## 2025-01-29 PROCEDURE — 81015 MICROSCOPIC EXAM OF URINE: CPT | Performed by: OBSTETRICS & GYNECOLOGY

## 2025-01-29 RX ORDER — NITROFURANTOIN 25; 75 MG/1; MG/1
100 CAPSULE ORAL 2 TIMES DAILY
Qty: 14 CAPSULE | Refills: 0 | Status: SHIPPED | OUTPATIENT
Start: 2025-01-29 | End: 2025-02-05

## 2025-01-29 NOTE — NURSING
0700 PATIENT PRESENTED TO OB DEPARTMENT FROM HOME WITH.COMPLAINTS OF WAKING UP AT 6 AM WITH ONE EPISODE OF A DIME SIZED AMOUNT OF DRIED BLOOD ON UNDERWEAR. PATIENT DENIES PAIN WITH THE BLEEDING. PATIENT DENIES INTERCOURSE WITHIN THE LAST 24 HOURS. PATIENT STATES SHE HAD A MISCARRIAGE IN , 6 WEEKS GESTATIONAL AGE.  PATIENT PLACED ON TOCO AND EFM MONITOR. PATIENT DENIES LEAKING OF FLUID AND CONTRACTIONS. PATIENT STATES ACTIVE FETAL MOVEMENT.    0915 DR. FLOYD NOTIFIED OF PATIENT STATUS. ORDERS NOTED TO COLLECT GC/CZ/TZ CULTURE, CLEAN CATCH UA, AND CERVICAL CHECK.     09 SVE DONE. CERVIX CLOSED/THICK/HIGH. NO BLOOD NOTED UPON EXAM. NO BLEEDING NOTED FOR 2 HOURS OF MONITORING. PATIENT IS  AB1, 28 WEEKS, MATERNAL BLOOD TYPE A+. PATIENT DENIES ANY COMPLAINTS AT THIS TIME. ORDERS NOTED AFTER COLLECTION OF CULTURES AND UA, PATIENT CAN BE DISCHARGED AS LONG AS PATIENT HAS HAD NO BLEEDING FOR 2 HOURS.     1035 MACROBID 100 MG PO BID X 7 DAYS SENT ELECTRONICALLY TO [facility] IN [place]. PATIENT DISCHARGED HOME WITH LABOR AND BLEEDING PRECAUTIONS. PATIENT VERBALIZED UNDERSTANDING AND DISCHARGED IN STABLE CONDITION WITH  BY SIDE.     1020 DR. FLOYD NOTIFIED OF UA RESULTS. VERBAL ORDERS GIVEN TO CALL OUT MACROBID 100 MG PO BID FOR ONE WEEK AND DISCHARGE PATIENT WITH BLEEDING PRECAUTIONS.

## 2025-01-30 LAB
C TRACH RRNA SPEC QL NAA+PROBE: NEGATIVE
N GONORRHOEA RRNA SPEC QL NAA+PROBE: NEGATIVE
SPECIMEN SOURCE: NORMAL
T VAGINALIS RRNA SPEC QL NAA+PROBE: NEGATIVE

## 2025-01-31 ENCOUNTER — LAB VISIT (OUTPATIENT)
Dept: LAB | Facility: HOSPITAL | Age: 25
End: 2025-01-31
Attending: OBSTETRICS & GYNECOLOGY
Payer: COMMERCIAL

## 2025-01-31 DIAGNOSIS — Z3A.27 27 WEEKS GESTATION OF PREGNANCY: ICD-10-CM

## 2025-01-31 DIAGNOSIS — O99.012 ANEMIA AFFECTING PREGNANCY IN SECOND TRIMESTER: ICD-10-CM

## 2025-01-31 LAB
BACTERIA UR CULT: NORMAL
FERRITIN SERPL-MCNC: 4.36 NG/ML (ref 6.24–264)
GLUCOSE 1H P 100 G GLC PO SERPL-MCNC: 136 MG/DL (ref 100–180)
IRON SATN MFR SERPL: 8 % (ref 20–50)
IRON SERPL-MCNC: 28 UG/DL (ref 50–170)
T PALLIDUM AB SER QL: NONREACTIVE
TIBC SERPL-MCNC: 308 UG/DL (ref 70–310)
TIBC SERPL-MCNC: 336 UG/DL (ref 250–450)
TRANSFERRIN SERPL-MCNC: 304 MG/DL (ref 180–382)

## 2025-01-31 PROCEDURE — 36415 COLL VENOUS BLD VENIPUNCTURE: CPT

## 2025-01-31 PROCEDURE — 82728 ASSAY OF FERRITIN: CPT

## 2025-01-31 PROCEDURE — 82950 GLUCOSE TEST: CPT

## 2025-01-31 PROCEDURE — 83540 ASSAY OF IRON: CPT

## 2025-01-31 PROCEDURE — 86780 TREPONEMA PALLIDUM: CPT

## 2025-02-05 DIAGNOSIS — O99.012 ANEMIA AFFECTING PREGNANCY IN SECOND TRIMESTER: Primary | ICD-10-CM

## 2025-02-05 RX ORDER — FERROUS SULFATE 325(65) MG
325 TABLET ORAL DAILY
Qty: 30 TABLET | Refills: 3 | Status: SHIPPED | OUTPATIENT
Start: 2025-02-05 | End: 2025-03-07

## 2025-02-10 ENCOUNTER — OFFICE VISIT (OUTPATIENT)
Dept: MATERNAL FETAL MEDICINE | Facility: CLINIC | Age: 25
End: 2025-02-10
Payer: COMMERCIAL

## 2025-02-10 ENCOUNTER — PATIENT MESSAGE (OUTPATIENT)
Dept: MATERNAL FETAL MEDICINE | Facility: CLINIC | Age: 25
End: 2025-02-10

## 2025-02-10 ENCOUNTER — PROCEDURE VISIT (OUTPATIENT)
Dept: MATERNAL FETAL MEDICINE | Facility: CLINIC | Age: 25
End: 2025-02-10
Payer: COMMERCIAL

## 2025-02-10 VITALS
WEIGHT: 293 LBS | HEART RATE: 106 BPM | HEIGHT: 62 IN | BODY MASS INDEX: 53.92 KG/M2 | SYSTOLIC BLOOD PRESSURE: 138 MMHG | DIASTOLIC BLOOD PRESSURE: 84 MMHG

## 2025-02-10 DIAGNOSIS — O99.213 SEVERE OBESITY DUE TO EXCESS CALORIES AFFECTING PREGNANCY IN THIRD TRIMESTER: ICD-10-CM

## 2025-02-10 DIAGNOSIS — E66.01 SEVERE OBESITY DUE TO EXCESS CALORIES AFFECTING PREGNANCY IN SECOND TRIMESTER: ICD-10-CM

## 2025-02-10 DIAGNOSIS — O35.EXX9: Primary | ICD-10-CM

## 2025-02-10 DIAGNOSIS — E66.01 SEVERE OBESITY DUE TO EXCESS CALORIES AFFECTING PREGNANCY IN THIRD TRIMESTER: ICD-10-CM

## 2025-02-10 DIAGNOSIS — O35.EXX0 PYELECTASIS OF FETUS ON PRENATAL ULTRASOUND: ICD-10-CM

## 2025-02-10 DIAGNOSIS — O36.63X0 LARGE FOR GESTATIONAL AGE FETUS AFFECTING MOTHER, ANTEPARTUM, THIRD TRIMESTER, SINGLE GESTATION: ICD-10-CM

## 2025-02-10 DIAGNOSIS — O99.343 MENTAL DISORDER AFFECTING PREGNANCY IN THIRD TRIMESTER: ICD-10-CM

## 2025-02-10 DIAGNOSIS — R03.0 ELEVATED BLOOD PRESSURE READING IN OFFICE WITHOUT DIAGNOSIS OF HYPERTENSION: ICD-10-CM

## 2025-02-10 DIAGNOSIS — O99.212 SEVERE OBESITY DUE TO EXCESS CALORIES AFFECTING PREGNANCY IN SECOND TRIMESTER: ICD-10-CM

## 2025-02-10 PROCEDURE — 3008F BODY MASS INDEX DOCD: CPT | Mod: CPTII,S$GLB,, | Performed by: OBSTETRICS & GYNECOLOGY

## 2025-02-10 PROCEDURE — 1159F MED LIST DOCD IN RCRD: CPT | Mod: CPTII,S$GLB,, | Performed by: OBSTETRICS & GYNECOLOGY

## 2025-02-10 PROCEDURE — 3079F DIAST BP 80-89 MM HG: CPT | Mod: CPTII,S$GLB,, | Performed by: OBSTETRICS & GYNECOLOGY

## 2025-02-10 PROCEDURE — 3075F SYST BP GE 130 - 139MM HG: CPT | Mod: CPTII,S$GLB,, | Performed by: OBSTETRICS & GYNECOLOGY

## 2025-02-10 PROCEDURE — 76816 OB US FOLLOW-UP PER FETUS: CPT | Mod: S$GLB,,, | Performed by: OBSTETRICS & GYNECOLOGY

## 2025-02-10 PROCEDURE — 99214 OFFICE O/P EST MOD 30 MIN: CPT | Mod: S$GLB,,, | Performed by: OBSTETRICS & GYNECOLOGY

## 2025-02-10 PROCEDURE — 1160F RVW MEDS BY RX/DR IN RCRD: CPT | Mod: CPTII,S$GLB,, | Performed by: OBSTETRICS & GYNECOLOGY

## 2025-02-10 RX ORDER — INSULIN PUMP SYRINGE, 3 ML
EACH MISCELLANEOUS
Qty: 1 EACH | Refills: 0 | Status: SHIPPED | OUTPATIENT
Start: 2025-02-10

## 2025-02-10 RX ORDER — LANCETS
EACH MISCELLANEOUS
Qty: 100 EACH | Refills: 0 | Status: SHIPPED | OUTPATIENT
Start: 2025-02-10

## 2025-02-10 NOTE — ASSESSMENT & PLAN NOTE
2/10/25 - Overall EFW 4#1oz at 29w5d - measuring 2 weeks ahead. AC 96%  Routine 1h glucola - 136. With borderline 1h glucola and accelerated growth velocity, we recommend blood sugar trends x 1 week. If all normal, may discontinue checks.

## 2025-02-10 NOTE — ASSESSMENT & PLAN NOTE
1/13/25- Today, she had mildly elevated blood pressures (147/86 and 146/82). She is asymptomatic. Pre-eclampsia labs ordered outpatient  UP:C 0.1, cr 0.61, nml LFTs,     BP normal at Dr Noriega's office on 1/27/25    2/10/25- 138/84

## 2025-02-10 NOTE — PROGRESS NOTES
"Maternal Fetal Medicine follow up consult    SUBJECTIVE:     Cait Virk is a 24 y.o.  female with IUP at 29w5d who is seen in follow up consultation by MFM.  Pregnancy complications include:   Problem   - Large for gestational age fetus affecting mother, antepartum, third trimester, single gestation   - Elevated blood pressure reading in office without diagnosis of hypertension   -BMI affecting pregnancy in third trimester   -Anxiety affecting pregnancy in third trimester   - Fetal hydronephrosis during pregnancy, antepartum, other fetus     Cait presents for routine follow up appointment.  Completed 1h glucola - 136. Also completed pre-e workup d/t mildly elevated Bps last time. All results normal. BP normal at Dr Noriega's office as well as here today.  Went to [facility] on 25 for spotting. No bleeding noted - discharged to home. UTI dx'ed - completed ABX.  Denies LOF, VB, contractions. Positive fetal movement.    Previous notes reviewed.   No changes to medical, surgical, family, social, or obstetric history.  Interval history since last MFM visit: see above  Medications reviewed.    Care team members:  Dr Noriega - Primary OB     OBJECTIVE:   /84 (BP Location: Right forearm, Patient Position: Sitting)   Pulse 106   Ht 5' 2" (1.575 m)   Wt (!) 141.2 kg (311 lb 2.9 oz)   LMP 2024 (Exact Date)   BMI 56.92 kg/m²     Ultrasound performed. See viewpoint for full ultrasound report.    A viable cortes pregnancy is visualized in cephalic presentation.  Estimated fetal weight is at the 63rd percentile with an abdominal circumference at the 96th percentile.  Left sided fetal hydronephrosis is noted, measuring 1.02cm without megaureter. Amniotic fluid volume is normal.  Placenta is posterior.    ASSESSMENT/PLAN:     24 y.o.  female with IUP at 29w5d    - Fetal hydronephrosis during pregnancy, antepartum, other fetus  Mild left sided renal pelvic dilation measuring 6.6 mm was noted " consistent with UTDA1. The kidneys appear normal as does the bladder. She has a low risk cfDNA and this is a male fetus.     The finding of mild renal pelvis dilation was discussed with the patient. We reviewed basic anatomy of the renal collecting system and then discussed possible etiologies for renal pelvis dilation. When dilation of the renal pelvis is borderline or mild, most cases will resolve spontaneously. However, if the renal dilation persists in the  period, the most common conditions that can cause renal pelvis dilation are UPJ obstruction (ureteropelvic junction), UVJ obstruction (ureterovesical junction) and VUR (vesicoureteral reflux). These conditions predispose infants/children to urinary tract infection, but can be effectively followed and treated. I explained that the vast majority of infants with these findings will have spontaneous resolution during pregnancy. We will plan to re-evaluate the kidneys at her next office visit.     25- Persistent mild pyelectasis (now bilateral L-6mm, R-4.3mm). AFV normal.    2/10/25- Persistent left sided renal pelvic dilation noted (1.02cm). Measurements of right kidney within normal limits for current gestational age. Referred to pediatric urology    -BMI affecting pregnancy in third trimester  Please see prior documentation for specific counseling.      BMI 57    Recommendations:  TWG goal is 11-20 lbs  Screen for signs/symptoms of obstructive sleep apnea  Nutritionist consult offered (this is to be ordered by primary OB provider)  Consider early 1 hr GCT (passed); repeat at 24-28 weeks gestation  Continue low dose aspirin 81 mg daily at 12-16 weeks for preeclampsia risk reduction  Targeted anatomical survey scheduled at 18-20 weeks - completed  Fetal growth ultrasound at 32 weeks if BMI >= 40  Weekly  testing at 32 weeks if pre-pregnancy BMI >= 45  Lovenox 40 mg BID for VTE prophylaxis while admitted to the hospital (antepartum or  postpartum) if BMI >= 40.   Encouraged breastfeeding  Postpartum lifestyle modifications & weight loss      - Elevated blood pressure reading in office without diagnosis of hypertension  1/13/25- Today, she had mildly elevated blood pressures (147/86 and 146/82). She is asymptomatic. Pre-eclampsia labs ordered outpatient  UP:C 0.1, cr 0.61, nml LFTs,     BP normal at Dr Noriega's office on 1/27/25    2/10/25- 138/84    -Anxiety affecting pregnancy in third trimester  She reports a history of anxiety. She takes Buspar BID. She reports stable symptoms. Discussed increased risk for peripartum and postpartum mood disorders. Precautions provided.      It is important to optimize mental health conditions during pregnancy in an effort to reduce the risk of postpartum mood disorders. There are options for management including psychotherapy, counseling, cognitive behavioral therapy, exercise/yoga, journaling, and psychiatry services.       - Large for gestational age fetus affecting mother, antepartum, third trimester, single gestation  2/10/25 - Overall EFW 4#1oz at 29w5d - measuring 2 weeks ahead. AC 96%  Routine 1h glucola - 136. With borderline 1h glucola and accelerated growth velocity, we recommend blood sugar trends x 1 week. If all normal, may discontinue checks.      F/u in 4 weeks for MFM visit /growth ultrasound    Regine Bhatt MD  Maternal Fetal Medicine

## 2025-02-11 DIAGNOSIS — O35.EXX9: Primary | ICD-10-CM

## 2025-02-12 ENCOUNTER — PATIENT MESSAGE (OUTPATIENT)
Dept: OTHER | Facility: OTHER | Age: 25
End: 2025-02-12
Payer: COMMERCIAL

## 2025-02-14 ENCOUNTER — TELEPHONE (OUTPATIENT)
Dept: PEDIATRIC UROLOGY | Facility: CLINIC | Age: 25
End: 2025-02-14
Payer: COMMERCIAL

## 2025-02-14 NOTE — TELEPHONE ENCOUNTER
Left voicemail for pt to call back to schedule appt. Call back number provided.     ----- Message from Nurse Marie sent at 2/12/2025 10:21 AM CST -----  Regarding: please schedule  Ambulatory referral/consult to Pediatric Urology Status: Needs Scheduling (Send-to-Patient Pending)   Requested appt date: 2/17/2025 Authorizing: Carisa Torres NP in [facility]  Referral: [id number] (Authorized)      Expires: 3/10/2026 Priority: Routine  Diagnosis: Fetal hydronephrosis during pregnancy, antepartum, other fetus [O35.EXX9]  Order Class: Internal Referral Referred to Provider: XIN LICONA JR    Notes  Assigned User Name: Marie Johnson LPN  Contact 1: 2/11/25, 12:01 PM - Outcome = Left Message/lmr  1st [id number]/consult to Pediatric Urology/Fetal hydronephrosis during pregnancy, antepar/sent in basket message to uro-peds staff to schedule/lmr  Order Specific Questions  What is the reason for the visit?  Kidney or Renal Abnormality/Hydronephrosis

## 2025-02-18 NOTE — PROGRESS NOTES
"Chief Complaint:  Routine Prenatal Visit (31.0 weeks)    History of Present Illness:  Cait Virk is a 24 y.o. year old  at 31w0d presents for her ob exam. She is doing well. +fetal movement. Denies vaginal bleeding, vaginal discharge, LOF or contractions. Negative preeclampsia ROS.        Review of Systems:  General/Constitutional: Fever denies. Headache denies.    Skin: Rash denies.   Respiratory: Cough denies. SOB denies. Wheezing denies .   Cardiovascular: Chest pain denies. Dizziness denies. Palpitations denies. Swelling in hands/feet denies.    Gastrointestinal: Abdominal pain denies. Constipation denies. Diarrhea denies. Heartburn denies. Nausea denies. Vomiting denies.    Genitourinary: Painful urination denies.   Gynecologic: Vaginal bleeding denies. Vaginal discharge denies. Leaking amniotic fluid denies. Contractions denies.    Psychiatric: Depressed mood denies. Suicidal thoughts denies.     Current Medications[1]      Physical Exam:  /82 (BP Location: Right arm, Patient Position: Sitting)   Temp 97.9 °F (36.6 °C) (Temporal)   Ht 5' 2" (1.575 m)   Wt (!) 141.3 kg (311 lb 9.6 oz)   LMP 2024 (Exact Date)   BMI 56.99 kg/m²       Constitutional: General appearance: healthy, well-nourished and well-developed   Psychiatric:  Orientation to time, place and person. Normal mood and affect and active, alert   Abdomen: Auscultation/Inspection/Palpation: Gravid. No tenderness or masses. Soft, nondistended   Extremities: no CCE      FH: deferred secondary to BMI  FHT: 150      Assessment/Plan  1. Fetal hydronephrosis during pregnancy, antepartum  Educated  Reviewed MFM note w/ pt  Keep MFM appt 3/10/25  [facility] attempted to contact pt, no answer      2/10/25- Persistent left sided renal pelvic dilation noted (1.02cm). Measurements of right kidney within normal limits for current gestational age. Referred to pediatric urology    25- Persistent mild pyelectasis (now bilateral L-6mm, " R-4.3mm). AFV normal.        2. Large gestation age fetus affecting mother  Educated  Reviewed MFM note w/ pt    2/10/25 - Overall EFW 4#1oz at 29w5d - measuring 2 weeks ahead. AC 96%  Routine 1h glucola - 136. With borderline 1h glucola and accelerated growth velocity, we recommend blood sugar trends x 1 week. If all normal, may discontinue checks.    Pt states pharmacy did not have glucometer kit. Re-ordered to OP pharmacy    3. Anemia affecting pregnancy in second trimester  Anemia is defined by the WHO as hemoglobin less than 11 g/dL with the equivalent of lesser hematocrit of 33%. It is present in the about 30% of reproductive age females, and 40% of print individuals. The 2 most common causes of anemia in pregnancy are physiologic anemia of pregnancy and  iron-deficiency. Much less common causes of anemia include hemoglobinopathies (sickle cell, thalassemia), RBC membrane disorders (hereditary spherocytosis and had hereditary Elliptocytosis), and acquired anemias (folate deficiency, vitamin B12 deficiency, other vitamin deficiencies, autoimmune hemolytic anemia, hypothyroidism and chronic kidney disease). Anemia in pregnancy is associated with the twofold increase in  delivery and 3 fold increase in delivering a low birth weight baby.     Educated    Ferrous sulfate 325 mg twice daily, vitamin-C 250 mg twice daily, and folic acid 1 mg daily    24  Iron: 28  TIBC: 336  Ferritin: 4.36    Lab Results   Component Value Date    HGB 9.4 (L) 2025    HGB 10.8 (L) 2024    HCT 31.2 (L) 2025    HCT 34.3 (L) 2024       4. Obesity in pregnancy, antepartum  Patient educated on risks and complications of obesity and pregnancy including but not limited to first trimester miscarriage recurrent miscarriages, congenital anomalies, HTN disorders, gestational diabetes, macrosomia, PTL&D, higher risk of fetal demise in-utero and higher risk of CS (and wound complication including infection  breakdown) with obesity.     The [place] preventative task force placed the following recommendations in 2016 which ACOG supports regarding utilization of low dose 81mg ASA starting at 14 weeks and continuing through pregnancy in high risk pregnant women. The utilization of this has been shown to reduce the risk for preeclampsia by 24% in clinical trials and reduce the risk of  birth by 14% and IUGR about 20%.      Cont 81mg ASA     Recommendations:  TWG goal is 11-20 lbs  Screen for signs/symptoms of obstructive sleep apnea  Nutritionist consult offered (this is to be ordered by primary OB provider)  Consider early 1 hr GCT (passed); repeat at 24-28 weeks gestation  Continue low dose aspirin 81 mg daily at 12-16 weeks for preeclampsia risk reduction  Targeted anatomical survey scheduled at 18-20 weeks - started today, some views limited  Fetal growth ultrasound at 32 weeks if BMI >= 40  Weekly  testing at 32 weeks if pre-pregnancy BMI >= 45  Lovenox 40 mg BID for VTE prophylaxis while admitted to the hospital (antepartum or postpartum) if BMI >= 40.   Encouraged breastfeeding  Postpartum lifestyle modifications & weight loss     5. Pyelectasis of fetus on prenatal ultrasound  Mild left sided renal pelvic dilation measuring 6.6 mm was noted consistent with UTDA1. The kidneys appear normal as does the bladder. She has a low risk cfDNA and this is a male fetus.     6. Anxiety in pregnancy, antepartum  Educated  Controlled w/ Buspar 7.5 mg, refills sent out  Counseling information given for [facility] [phone]  HI & SI precautions  Advised to call if symptoms persist     7. 31 weeks gestation of pregnancy  Discussed that should any of the following symptoms occur during pregnancy, patient should contact the office immediately or go to the ER after business hours: spotting or bleeding, cramping, painful urination, severe vomiting, pain in one leg or calf, sudden gush of fluid, decrease or absence  of fetal movement, sudden weight gain, periorbital or facial swelling, headache with visual changes and/or photophobia.       Discussed with patient travel precautions.  RTC 3 weeks for NST      Educated on compliance of future appts  Future Appointments   Date Time Provider Department Center   3/10/2025  3:[facility]   3/10/2025  4:20 PM Regine Bhatt MD [facility]   3/18/2025  7:50 AM Geri Noriega MD [facility]       This note was transcribed by Brisa Lundberg MA. There may be transcription errors as a result, however minimal. I agree with transcription and every effort has been made to ensure accuracy of transcription, but any obvious errors or omissions should be clarified with the author of the document.               [1]   Current Outpatient Medications:     acetaminophen (TYLENOL) 500 MG tablet, Take 500 mg by mouth every 6 (six) hours as needed for Pain., Disp: , Rfl:     aspirin (ECOTRIN) 81 MG EC tablet, Take 1 tablet (81 mg total) by mouth once daily., Disp: 150 tablet, Rfl: 1    busPIRone (BUSPAR) 7.5 MG tablet, Take 1 tablet (7.5 mg total) by mouth 2 (two) times a day., Disp: 60 tablet, Rfl: 5    ferrous sulfate (FEOSOL) 325 mg (65 mg iron) Tab tablet, Take 1 tablet (325 mg total) by mouth once daily., Disp: 30 tablet, Rfl: 3    hydrOXYzine pamoate (VISTARIL) 50 MG Cap, Take 1 capsule (50 mg total) by mouth every 6 (six) hours as needed (anxiety)., Disp: 30 capsule, Rfl: 1    prenatal no115/iron/folic acid (PRENATAL 19 ORAL), Take by mouth., Disp: , Rfl:     blood sugar diagnostic Strp, 1 strip by Misc.(Non-Drug; Combo Route) route 4 (four) times daily., Disp: 200 strip, Rfl: 1    blood-glucose meter kit, Use as instructed, Disp: 1 each, Rfl: 0    lancets (ACCU-CHEK SOFTCLIX LANCETS) Misc, 1 Lancet by Misc.(Non-Drug; Combo Route) route 4 (four) times daily., Disp: 200 each, Rfl: 1

## 2025-02-19 ENCOUNTER — ROUTINE PRENATAL (OUTPATIENT)
Dept: OBSTETRICS AND GYNECOLOGY | Facility: CLINIC | Age: 25
End: 2025-02-19
Payer: COMMERCIAL

## 2025-02-19 VITALS
DIASTOLIC BLOOD PRESSURE: 82 MMHG | TEMPERATURE: 98 F | BODY MASS INDEX: 53.92 KG/M2 | HEIGHT: 62 IN | SYSTOLIC BLOOD PRESSURE: 128 MMHG | WEIGHT: 293 LBS

## 2025-02-19 DIAGNOSIS — O35.EXX9: ICD-10-CM

## 2025-02-19 DIAGNOSIS — D50.9 IRON DEFICIENCY ANEMIA DURING PREGNANCY: ICD-10-CM

## 2025-02-19 DIAGNOSIS — O36.63X0 LARGE FOR GESTATIONAL AGE FETUS AFFECTING MOTHER, ANTEPARTUM, THIRD TRIMESTER, SINGLE GESTATION: ICD-10-CM

## 2025-02-19 DIAGNOSIS — Z3A.31 31 WEEKS GESTATION OF PREGNANCY: ICD-10-CM

## 2025-02-19 DIAGNOSIS — O99.012 ANEMIA AFFECTING PREGNANCY IN SECOND TRIMESTER: Primary | ICD-10-CM

## 2025-02-19 DIAGNOSIS — O99.340 ANXIETY IN PREGNANCY, ANTEPARTUM: ICD-10-CM

## 2025-02-19 DIAGNOSIS — F41.9 ANXIETY IN PREGNANCY, ANTEPARTUM: ICD-10-CM

## 2025-02-19 DIAGNOSIS — O35.EXX0 PYELECTASIS OF FETUS ON PRENATAL ULTRASOUND: ICD-10-CM

## 2025-02-19 DIAGNOSIS — O99.210 OBESITY IN PREGNANCY, ANTEPARTUM: ICD-10-CM

## 2025-02-19 DIAGNOSIS — O99.019 IRON DEFICIENCY ANEMIA DURING PREGNANCY: ICD-10-CM

## 2025-02-19 LAB
BILIRUB UR QL STRIP: ABNORMAL
GLUCOSE UR QL STRIP: NEGATIVE
KETONES UR QL STRIP: ABNORMAL
LEUKOCYTE ESTERASE UR QL STRIP: POSITIVE
PH, POC UA: ABNORMAL
POC BLOOD, URINE: ABNORMAL
POC NITRATES, URINE: NEGATIVE
PROT UR QL STRIP: ABNORMAL
SP GR UR STRIP: ABNORMAL (ref 1–1.03)
UROBILINOGEN UR STRIP-ACNC: ABNORMAL (ref 0.3–2.2)

## 2025-02-19 RX ORDER — LANCETS
1 EACH MISCELLANEOUS 4 TIMES DAILY
Qty: 200 EACH | Refills: 1 | Status: SHIPPED | OUTPATIENT
Start: 2025-02-19

## 2025-02-19 RX ORDER — INSULIN PUMP SYRINGE, 3 ML
EACH MISCELLANEOUS
Qty: 1 EACH | Refills: 0 | Status: SHIPPED | OUTPATIENT
Start: 2025-02-19

## 2025-02-20 ENCOUNTER — TELEPHONE (OUTPATIENT)
Dept: PEDIATRIC UROLOGY | Facility: CLINIC | Age: 25
End: 2025-02-20
Payer: COMMERCIAL

## 2025-02-20 NOTE — TELEPHONE ENCOUNTER
Left voicemail for pt to call back to set up virtual visit. Call back number provided    ----- Message from Nurse Marie sent at 2/18/2025  4:51 PM CST -----  Regarding: please schedule  Ambulatory referral/consult to Pediatric Urology Status: Needs Scheduling (Send-to-Patient Pending) Requested appt date: 2/17/2025 Authorizing: Carisa Torres NP in [facility]eferral: [id number] (Authorized)    Expires: 3/10/2026 Priority: RoutineDiagnosis: Fetal hydronephrosis during pregnancy, antepartum, other fetus [O35.EXX9]Order Class: Internal Referral Referred to Provider: XIN LICONAAssnora User Name: Slim Morales 2: 2/12/25, 10:24 AM - Outcome = Left Message/lmr2nd ueqrbjf-[id number]/onsult to Pediatric Urology/Diagnosis: Fetal hydronephrosis during pregnancy, antepart/sent inbasket message to uro-peds staff to schedule/lmrAssigned User Name: Slim Morales 1: 2/11/25, 12:01 PM - Outcome = Left Message/lmr1st [id number]/consult to Pediatric Urology/Fetal hydronephrosis during pregnancy, antepar/sent in basket message to uro-peds staff to schedule/lmrOrder Specific QuestionsWhat is the reason for the visit?Kidney or Renal Abnormality/Hydronephrosis

## 2025-02-26 ENCOUNTER — PATIENT MESSAGE (OUTPATIENT)
Dept: OTHER | Facility: OTHER | Age: 25
End: 2025-02-26
Payer: COMMERCIAL

## 2025-02-27 ENCOUNTER — PATIENT MESSAGE (OUTPATIENT)
Dept: OBSTETRICS AND GYNECOLOGY | Facility: CLINIC | Age: 25
End: 2025-02-27
Payer: COMMERCIAL

## 2025-03-05 NOTE — PROGRESS NOTES
"Chief Complaint:  Routine Prenatal Visit and Non-stress Test    History of Present Illness:  Cait Virk is a 24 y.o. year old  at 34w6d presents for her ob exam. She is doing well. +fetal movement. Denies vaginal bleeding, vaginal discharge, LOF or contractions. Negative preeclampsia ROS.        Review of Systems:  General/Constitutional: Fever denies. Headache denies.    Skin: Rash denies.   Respiratory: Cough denies. SOB denies. Wheezing denies .   Cardiovascular: Chest pain denies. Dizziness denies. Palpitations denies. Swelling in hands/feet denies.    Gastrointestinal: Abdominal pain denies. Constipation denies. Diarrhea denies. Heartburn denies. Nausea denies. Vomiting denies.    Genitourinary: Painful urination denies.   Gynecologic: Vaginal bleeding denies. Vaginal discharge denies. Leaking amniotic fluid denies. Contractions denies.    Psychiatric: Depressed mood denies. Suicidal thoughts denies.     Current Medications[1]      Physical Exam:  /78 (BP Location: Left arm, Patient Position: Sitting)   Temp 97.5 °F (36.4 °C) (Temporal)   Ht 5' 2" (1.575 m)   Wt (!) 143.3 kg (316 lb)   LMP 2024 (Exact Date)   BMI 57.80 kg/m²       Constitutional: General appearance: healthy, well-nourished and well-developed   Psychiatric:  Orientation to time, place and person. Normal mood and affect and active, alert   Abdomen: Auscultation/Inspection/Palpation: Gravid. No tenderness or masses. Soft, nondistended   Extremities: no CCE      NST:   - Baseline: 140s  - Variability: moderate  - Accelerations: PRESENT: 15X15   - Decelerations: ABSENT  - Time on monitor: 20 minutes  - Category: .reactive, category 1    [place]:   - CTX: ABSENT    Biophysical Profile:  2/2 - Breathing  2/2 - Tone  2/2 - Movement  2/2 - Fluid    Score: 10/10        Assessment/Plan  1. Fetal hydronephrosis during pregnancy, antepartum  Educated  Reviewed MFM note w/ pt  No future MFM appts have been made      3/10/25- Left " 12.1mm. Peds urology reached out to schedule appt but unable to get in touch with patient. Recommend  evaluation      2/10/25- Persistent left sided renal pelvic dilation noted (1.02cm). Measurements of right kidney within normal limits for current gestational age. Referred to pediatric urology     25- Persistent mild pyelectasis (now bilateral L-6mm, R-4.3mm). AFV normal.         2. Pyelectasis of fetus on prenatal ultrasound  Mild left sided renal pelvic dilation measuring 6.6 mm was noted consistent with UTDA1. The kidneys appear normal as does the bladder. She has a low risk cfDNA and this is a male fetus.     3. Large for gestational age fetus affecting mother, antepartum, third trimester, single gestation  Educated  Reviewed MFM note w/ pt  Routine 1hr glucose 136    3/10/25- Overall EFW 5#9oz at 33w5d - measuring 2w2d ahead.  She was offered to return for repeat growth assessment but declines due to work schedule.     2/10/25 - Overall EFW 4#1oz at 29w5d - measuring 2 weeks ahead. AC 96%  Routine 1h glucola - 136. With borderline 1h glucola and accelerated growth velocity, we recommend blood sugar trends x 1 week. If all normal, may discontinue checks.     4. Anemia affecting pregnancy in second trimester  Anemia is defined by the WHO as hemoglobin less than 11 g/dL with the equivalent of lesser hematocrit of 33%. It is present in the about 30% of reproductive age females, and 40% of print individuals. The 2 most common causes of anemia in pregnancy are physiologic anemia of pregnancy and  iron-deficiency. Much less common causes of anemia include hemoglobinopathies (sickle cell, thalassemia), RBC membrane disorders (hereditary spherocytosis and had hereditary Elliptocytosis), and acquired anemias (folate deficiency, vitamin B12 deficiency, other vitamin deficiencies, autoimmune hemolytic anemia, hypothyroidism and chronic kidney disease). Anemia in pregnancy is associated with the twofold  increase in  delivery and 3 fold increase in delivering a low birth weight baby.     Educated    Ferrous sulfate 325 mg twice daily, vitamin-C 250 mg twice daily, and folic acid 1 mg daily     24  Iron: 28  TIBC: 336  Ferritin: 4.36     Lab Results   Component Value Date    HGB 9.4 (L) 2025    HGB 10.8 (L) 2024    HCT 31.2 (L) 2025    HCT 34.3 (L) 2024       5. Obesity in pregnancy, antepartum  Patient educated on risks and complications of obesity and pregnancy including but not limited to first trimester miscarriage recurrent miscarriages, congenital anomalies, HTN disorders, gestational diabetes, macrosomia, PTL&D, higher risk of fetal demise in-utero and higher risk of CS (and wound complication including infection breakdown) with obesity.     The [place] preventative task force placed the following recommendations in 2016 which ACOG supports regarding utilization of low dose 81mg ASA starting at 14 weeks and continuing through pregnancy in high risk pregnant women. The utilization of this has been shown to reduce the risk for preeclampsia by 24% in clinical trials and reduce the risk of  birth by 14% and IUGR about 20%.      Cont 81mg ASA     Recommendations:  TWG goal is 11-20 lbs  Screen for signs/symptoms of obstructive sleep apnea  Nutritionist consult offered (this is to be ordered by primary OB provider)  Consider early 1 hr GCT (passed); repeat at 24-28 weeks gestation  Continue low dose aspirin 81 mg daily at 12-16 weeks for preeclampsia risk reduction  Targeted anatomical survey scheduled at 18-20 weeks - started today, some views limited  Fetal growth ultrasound at 32 weeks if BMI >= 40  Weekly  testing at 32 weeks if pre-pregnancy BMI >= 45  Lovenox 40 mg BID for VTE prophylaxis while admitted to the hospital (antepartum or postpartum) if BMI >= 40.   Encouraged breastfeeding  Postpartum lifestyle modifications & weight loss     6. Anxiety in pregnancy,  antepartum  Educated  Controlled w/ Buspar 7.5 mg, refills sent out  Counseling information given for [facility] [phone]  HI & SI precautions  Advised to call if symptoms persist     7. 34 weeks gestation of pregnancy  Discussed that should any of the following symptoms occur during pregnancy, patient should contact the office immediately or go to the ER after business hours: spotting or bleeding, cramping, painful urination, severe vomiting, pain in one leg or calf, sudden gush of fluid, decrease or absence of fetal movement, sudden weight gain, periorbital or facial swelling, headache with visual changes and/or photophobia.       Discussed with patient travel precautions.  RTC 1 week for NST      Educated on compliance of future appts  Future Appointments   Date Time Provider Department Center   3/25/2025  8:00 AM Geri Noriega MD [facility]         This note was transcribed by Brisa Lundberg MA. There may be transcription errors as a result, however minimal. I agree with transcription and every effort has been made to ensure accuracy of transcription, but any obvious errors or omissions should be clarified with the author of the document.               [1]   Current Outpatient Medications:     acetaminophen (TYLENOL) 500 MG tablet, Take 500 mg by mouth every 6 (six) hours as needed for Pain., Disp: , Rfl:     aspirin (ECOTRIN) 81 MG EC tablet, Take 1 tablet (81 mg total) by mouth once daily., Disp: 150 tablet, Rfl: 1    busPIRone (BUSPAR) 7.5 MG tablet, Take 1 tablet (7.5 mg total) by mouth 2 (two) times a day., Disp: 60 tablet, Rfl: 5    ferrous sulfate (FEOSOL) Tab tablet, Take 1 tablet by mouth daily with breakfast., Disp: , Rfl:     hydrOXYzine pamoate (VISTARIL) 50 MG Cap, Take 1 capsule (50 mg total) by mouth every 6 (six) hours as needed (anxiety)., Disp: 30 capsule, Rfl: 1    prenatal no115/iron/folic acid (PRENATAL 19 ORAL), Take by mouth., Disp: , Rfl:

## 2025-03-10 ENCOUNTER — PROCEDURE VISIT (OUTPATIENT)
Dept: MATERNAL FETAL MEDICINE | Facility: CLINIC | Age: 25
End: 2025-03-10
Payer: COMMERCIAL

## 2025-03-10 ENCOUNTER — OFFICE VISIT (OUTPATIENT)
Dept: MATERNAL FETAL MEDICINE | Facility: CLINIC | Age: 25
End: 2025-03-10
Payer: COMMERCIAL

## 2025-03-10 VITALS
SYSTOLIC BLOOD PRESSURE: 117 MMHG | DIASTOLIC BLOOD PRESSURE: 69 MMHG | BODY MASS INDEX: 53.92 KG/M2 | WEIGHT: 293 LBS | HEART RATE: 98 BPM | HEIGHT: 62 IN

## 2025-03-10 DIAGNOSIS — O35.EXX9: ICD-10-CM

## 2025-03-10 DIAGNOSIS — O36.63X0 LARGE FOR GESTATIONAL AGE FETUS AFFECTING MOTHER, ANTEPARTUM, THIRD TRIMESTER, SINGLE GESTATION: ICD-10-CM

## 2025-03-10 DIAGNOSIS — O99.213 SEVERE OBESITY DUE TO EXCESS CALORIES AFFECTING PREGNANCY IN THIRD TRIMESTER: ICD-10-CM

## 2025-03-10 DIAGNOSIS — E66.01 SEVERE OBESITY DUE TO EXCESS CALORIES AFFECTING PREGNANCY IN THIRD TRIMESTER: ICD-10-CM

## 2025-03-10 DIAGNOSIS — O99.343 MENTAL DISORDER AFFECTING PREGNANCY IN THIRD TRIMESTER: ICD-10-CM

## 2025-03-10 DIAGNOSIS — R03.0 ELEVATED BLOOD PRESSURE READING IN OFFICE WITHOUT DIAGNOSIS OF HYPERTENSION: Primary | ICD-10-CM

## 2025-03-10 PROCEDURE — 3078F DIAST BP <80 MM HG: CPT | Mod: CPTII,S$GLB,, | Performed by: OBSTETRICS & GYNECOLOGY

## 2025-03-10 PROCEDURE — 76816 OB US FOLLOW-UP PER FETUS: CPT | Mod: S$GLB,,, | Performed by: OBSTETRICS & GYNECOLOGY

## 2025-03-10 PROCEDURE — 3074F SYST BP LT 130 MM HG: CPT | Mod: CPTII,S$GLB,, | Performed by: OBSTETRICS & GYNECOLOGY

## 2025-03-10 PROCEDURE — 99214 OFFICE O/P EST MOD 30 MIN: CPT | Mod: S$GLB,,, | Performed by: OBSTETRICS & GYNECOLOGY

## 2025-03-10 PROCEDURE — 1160F RVW MEDS BY RX/DR IN RCRD: CPT | Mod: CPTII,S$GLB,, | Performed by: OBSTETRICS & GYNECOLOGY

## 2025-03-10 PROCEDURE — 1159F MED LIST DOCD IN RCRD: CPT | Mod: CPTII,S$GLB,, | Performed by: OBSTETRICS & GYNECOLOGY

## 2025-03-10 PROCEDURE — 76819 FETAL BIOPHYS PROFIL W/O NST: CPT | Mod: S$GLB,,, | Performed by: OBSTETRICS & GYNECOLOGY

## 2025-03-10 PROCEDURE — 3008F BODY MASS INDEX DOCD: CPT | Mod: CPTII,S$GLB,, | Performed by: OBSTETRICS & GYNECOLOGY

## 2025-03-10 RX ORDER — LANOLIN ALCOHOL/MO/W.PET/CERES
1 CREAM (GRAM) TOPICAL
COMMUNITY

## 2025-03-10 NOTE — ASSESSMENT & PLAN NOTE
2/10/25 - Overall EFW 4#1oz at 29w5d - measuring 2 weeks ahead. AC 96%  Routine 1h glucola - 136. With borderline 1h glucola and accelerated growth velocity, we recommend blood sugar trends x 1 week. If all normal, may discontinue checks.  2/27/25- BS log normal.  3/10/25- Overall EFW 5#9oz at 33w5d - measuring 2w2d ahead.  She was offered to return for repeat growth assessment but declines due to work schedule.

## 2025-03-10 NOTE — PROGRESS NOTES
"Maternal Fetal Medicine follow up consult    SUBJECTIVE:     Cait Virk is a 24 y.o.  female with IUP at 33w5d who is seen in follow up consultation by MFM.  Pregnancy complications include:   Problem   - Large for gestational age fetus affecting mother, antepartum, third trimester, single gestation   - Elevated blood pressure reading in office without diagnosis of hypertension   -BMI affecting pregnancy in third trimester   -Anxiety affecting pregnancy in third trimester   - Fetal hydronephrosis during pregnancy, antepartum, other fetus     Cait presents for routine follow up appointment.  Denies LOF, VB, contractions. Positive fetal movement.    Previous notes reviewed.   No changes to medical, surgical, family, social, or obstetric history.  Interval history since last MFM visit: see above  Medications reviewed.    Care team members:  Dr Noriega - Primary OB     OBJECTIVE:   /69 (BP Location: Right arm, Patient Position: Sitting)   Pulse 98   Ht 5' 2" (1.575 m)   Wt (!) 142.7 kg (314 lb 9.5 oz)   LMP 2024 (Exact Date)   BMI 57.54 kg/m²     Ultrasound performed. See viewpoint for full ultrasound report.    A viable cortes pregnancy is visualized in cephalic presentation.  Estimated fetal weight is at the 44th percentile with an abdominal circumference at the 62nd percentile.  Left sided fetal hydronephrosis is noted, measuring 1.21cm without megaureter. Right pyelectasis is present measuring 9.3mm. Amniotic fluid volume is normal.  Placenta is posterior. Biophysical profile is 8/8.    ASSESSMENT/PLAN:     24 y.o.  female with IUP at 33w5d    - Elevated blood pressure reading in office without diagnosis of hypertension  25- Today, she had mildly elevated blood pressures (147/86 and 146/82). She is asymptomatic. Pre-eclampsia labs ordered outpatient  UP:C 0.1, cr 0.61, nml LFTs,     BP normal at Dr Noriega's office on 1/27/25    2/10/25- 138/84    3/10/25- " 117/69    -Anxiety affecting pregnancy in third trimester  She reports a history of anxiety. She takes Buspar BID. She reports stable symptoms. Discussed increased risk for peripartum and postpartum mood disorders. Precautions provided.      It is important to optimize mental health conditions during pregnancy in an effort to reduce the risk of postpartum mood disorders. There are options for management including psychotherapy, counseling, cognitive behavioral therapy, exercise/yoga, journaling, and psychiatry services.       -BMI affecting pregnancy in third trimester  Please see prior documentation for specific counseling.      BMI 58    Recommendations:  TWG goal is 11-20 lbs  Screen for signs/symptoms of obstructive sleep apnea  Nutritionist consult offered (this is to be ordered by primary OB provider)  Consider early 1 hr GCT (passed); repeat at 24-28 weeks gestation  Continue low dose aspirin 81 mg daily at 12-16 weeks for preeclampsia risk reduction  Targeted anatomical survey scheduled at 18-20 weeks - completed  Fetal growth ultrasound at 32 weeks if BMI >= 40  Weekly  testing at 32 weeks if pre-pregnancy BMI >= 45  Lovenox 40 mg BID for VTE prophylaxis while admitted to the hospital (antepartum or postpartum) if BMI >= 40.   Encouraged breastfeeding  Postpartum lifestyle modifications & weight loss      - Large for gestational age fetus affecting mother, antepartum, third trimester, single gestation  2/10/25 - Overall EFW 4#1oz at 29w5d - measuring 2 weeks ahead. AC 96%  Routine 1h glucola - 136. With borderline 1h glucola and accelerated growth velocity, we recommend blood sugar trends x 1 week. If all normal, may discontinue checks.  25- BS log normal.  3/10/25- Overall EFW 5#9oz at 33w5d - measuring 2w2d ahead.  She was offered to return for repeat growth assessment but declines due to work schedule.    - Fetal hydronephrosis during pregnancy, antepartum, other fetus  Mild left sided  renal pelvic dilation measuring 6.6 mm was noted consistent with UTDA1. The kidneys appear normal as does the bladder. She has a low risk cfDNA and this is a male fetus.     The finding of mild renal pelvis dilation was discussed with the patient. We reviewed basic anatomy of the renal collecting system and then discussed possible etiologies for renal pelvis dilation. When dilation of the renal pelvis is borderline or mild, most cases will resolve spontaneously. However, if the renal dilation persists in the  period, the most common conditions that can cause renal pelvis dilation are UPJ obstruction (ureteropelvic junction), UVJ obstruction (ureterovesical junction) and VUR (vesicoureteral reflux). These conditions predispose infants/children to urinary tract infection, but can be effectively followed and treated. I explained that the vast majority of infants with these findings will have spontaneous resolution during pregnancy. We will plan to re-evaluate the kidneys at her next office visit.     25- Persistent mild pyelectasis (now bilateral L-6mm, R-4.3mm). AFV normal.    2/10/25- Persistent left sided renal pelvic dilation noted (10.2mm). Measurements of right kidney within normal limits for current gestational age. Referred to pediatric urology    3/10/25- Left 12.1mm. Peds urology reached out to schedule appt but unable to get in touch with patient. Recommend  evaluation      No further [facility] follow up has been made.    Regine Bhatt MD  Maternal Fetal Medicine

## 2025-03-10 NOTE — ASSESSMENT & PLAN NOTE
1/13/25- Today, she had mildly elevated blood pressures (147/86 and 146/82). She is asymptomatic. Pre-eclampsia labs ordered outpatient  UP:C 0.1, cr 0.61, nml LFTs,     BP normal at Dr Noriega's office on 1/27/25    2/10/25- 138/84    3/10/25- 117/69

## 2025-03-10 NOTE — ASSESSMENT & PLAN NOTE
Mild left sided renal pelvic dilation measuring 6.6 mm was noted consistent with UTDA1. The kidneys appear normal as does the bladder. She has a low risk cfDNA and this is a male fetus.     The finding of mild renal pelvis dilation was discussed with the patient. We reviewed basic anatomy of the renal collecting system and then discussed possible etiologies for renal pelvis dilation. When dilation of the renal pelvis is borderline or mild, most cases will resolve spontaneously. However, if the renal dilation persists in the  period, the most common conditions that can cause renal pelvis dilation are UPJ obstruction (ureteropelvic junction), UVJ obstruction (ureterovesical junction) and VUR (vesicoureteral reflux). These conditions predispose infants/children to urinary tract infection, but can be effectively followed and treated. I explained that the vast majority of infants with these findings will have spontaneous resolution during pregnancy. We will plan to re-evaluate the kidneys at her next office visit.     23- Persistent mild pyelectasis (now bilateral L-6mm, R-4.3mm). AFV normal.    2/10/25- Persistent left sided renal pelvic dilation noted (10.2mm). Measurements of right kidney within normal limits for current gestational age. Referred to pediatric urology    3/10/25- Left 12.1mm. Peds urology reached out to schedule appt but unable to get in touch with patient. Recommend  evaluation

## 2025-03-10 NOTE — ASSESSMENT & PLAN NOTE
Please see prior documentation for specific counseling.      BMI 58    Recommendations:  TWG goal is 11-20 lbs  Screen for signs/symptoms of obstructive sleep apnea  Nutritionist consult offered (this is to be ordered by primary OB provider)  Consider early 1 hr GCT (passed); repeat at 24-28 weeks gestation  Continue low dose aspirin 81 mg daily at 12-16 weeks for preeclampsia risk reduction  Targeted anatomical survey scheduled at 18-20 weeks - completed  Fetal growth ultrasound at 32 weeks if BMI >= 40  Weekly  testing at 32 weeks if pre-pregnancy BMI >= 45  Lovenox 40 mg BID for VTE prophylaxis while admitted to the hospital (antepartum or postpartum) if BMI >= 40.   Encouraged breastfeeding  Postpartum lifestyle modifications & weight loss

## 2025-03-18 ENCOUNTER — CLINICAL SUPPORT (OUTPATIENT)
Dept: OBSTETRICS AND GYNECOLOGY | Facility: CLINIC | Age: 25
End: 2025-03-18
Payer: COMMERCIAL

## 2025-03-18 VITALS
BODY MASS INDEX: 53.92 KG/M2 | HEIGHT: 62 IN | TEMPERATURE: 98 F | DIASTOLIC BLOOD PRESSURE: 78 MMHG | WEIGHT: 293 LBS | SYSTOLIC BLOOD PRESSURE: 124 MMHG

## 2025-03-18 DIAGNOSIS — Z3A.34 34 WEEKS GESTATION OF PREGNANCY: ICD-10-CM

## 2025-03-18 DIAGNOSIS — O36.63X0 LARGE FOR GESTATIONAL AGE FETUS AFFECTING MOTHER, ANTEPARTUM, THIRD TRIMESTER, SINGLE GESTATION: ICD-10-CM

## 2025-03-18 DIAGNOSIS — O35.EXX0 PYELECTASIS OF FETUS ON PRENATAL ULTRASOUND: ICD-10-CM

## 2025-03-18 DIAGNOSIS — F41.9 ANXIETY IN PREGNANCY, ANTEPARTUM: ICD-10-CM

## 2025-03-18 DIAGNOSIS — O99.340 ANXIETY IN PREGNANCY, ANTEPARTUM: ICD-10-CM

## 2025-03-18 DIAGNOSIS — O35.EXX9: Primary | ICD-10-CM

## 2025-03-18 DIAGNOSIS — O99.012 ANEMIA AFFECTING PREGNANCY IN SECOND TRIMESTER: ICD-10-CM

## 2025-03-18 DIAGNOSIS — O99.210 OBESITY IN PREGNANCY, ANTEPARTUM: ICD-10-CM

## 2025-03-18 NOTE — H&P
History and Physical      Chief Complaint:  Routine Prenatal Visit    History of Present Illness:  Cait Virk is a 24 y.o. year old  at 35w6d presents for preadmit ob. She is doing well. +fetal movement. Denies vaginal bleeding, vaginal discharge, LOF or contractions. Negative preeclampsia ROS.        Review of Systems:  General/Constitutional: Fever denies .    Skin: Rash denies.   Respiratory: Cough denies. SOB denies. Wheezing denies .   Cardiovascular: Chest pain denies. Dizziness denies. Palpitations denies. Swelling in hands/feet denies    Gastrointestinal: Abdominal pain denies. Constipation denies. Diarrhea denies. Heartburn denies. Nausea denies. Vomiting denies    Genitourinary: Painful urination denies.   Gynecologic: Vaginal bleeding denies. Vaginal discharge Normal. Leaking amniotic fluid denies. Contractions denies    Psychiatric: Depressed mood denies. Suicidal thoughts denies.     OB History    Para Term  AB Living   2 0 0 0 1 0   SAB IAB Ectopic Multiple Live Births   1 0 0 0 0      # 1 - Date: 22, Sex: None, Weight: None, GA: 6w0d, Type: Spontaneous , Apgar1: None, Apgar5: None, Living: None, Birth Comments: None    # 2 - Date: None, Sex: None, Weight: None, GA: None, Type: None, Apgar1: None, Apgar5: None, Living: None, Birth Comments: None      Past Medical History:   Diagnosis Date    Anemia     Anxiety disorder, unspecified     Depression      Current Medications[1]    Review of patient's allergies indicates:  No Known Allergies    History reviewed. No pertinent surgical history.    Family History   Problem Relation Name Age of Onset    Heart failure Maternal Grandfather Wagner Quiñones         Had a heart attack in  still alive    Hypertension Father Quinn Virk     Migraines Mother Dinesh Quiñones         Constant migrianes on medication for them    Rashes / Skin problems Mother Dinesh Quiñones         Eczema    Thyroid disease Mother Dinesh  "Ishmael     Migraines Sister Viji Campbell         Constant migraines on medication for them    Heart failure Paternal Uncle Gabriel Virk         Had 3 heart attacks and  in  from the last one    Breast cancer Neg Hx      Colon cancer Neg Hx      Ovarian cancer Neg Hx      Uterine cancer Neg Hx       Social History[2]    Physical Exam:  /80 (BP Location: Right arm, Patient Position: Sitting)   Temp 97.5 °F (36.4 °C)   Ht 5' 2" (1.575 m)   Wt (!) 143.7 kg (316 lb 12.8 oz)   LMP 2024 (Exact Date)   BMI 57.94 kg/m²       Chaperone present    Constitutional: General appearance: healthy, well-nourished and well-developed   Psychiatric:  Orientation to time, place and person. Normal mood and affect and active, alert  Cardiovascular: RRR, no murmurs, gallops or rub  Respiratory: clear to auscultate bilaterally, no wheezes, rales, or rhonchi  Abdomen: Auscultation/Inspection/Palpation: No tenderness or masses. Soft, nondistended   Extremities: no CCE      NST:   - Baseline: 140s  - Variability: moderate  - Accelerations: PRESENT: 15X15   - Decelerations: ABSENT  - Time on monitor: 20 minutes  - Category: .reactive, category 1    [place]:   - CTX: ABSENT      Biophysical Profile:  2/2 - Breathing  2/2 - Tone  2/2 - Movement  2/2 - Fluid    Score: 8/8        ABO/Rh:   Lab Results   Component Value Date/Time    GROUPTRH A POS 10/02/2024 01:52 PM     H&H:  Lab Results   Component Value Date/Time    HGB 9.4 (L) 2025 11:47 AM    HCT 31.2 (L) 2025 11:47 AM     Platelet:  Lab Results   Component Value Date/Time     2025 11:47 AM     [place]ivan:   Lab Results   Component Value Date/Time    KYGI0DD 136 2025 08:12 AM     HIV:   Lab Results   Component Value Date/Time    HIV Nonreactive 10/02/2024 01:52 PM     RPR:  Lab Results   Component Value Date/Time    SYPHAB Nonreactive 2025 08:12 AM     Hepatitis B Surface Antigen:  Lab Results   Component Value Date/Time    HEPBSAG " Negative 10/02/2024 01:52 PM     Hepatitis C:  Lab Results   Component Value Date/Time    HEPCAB Nonreactive 10/02/2024 01:52 PM     Rubella Immune Status:  Lab Results   Component Value Date/Time    RUBELLAIGG 25.80 10/02/2024 01:52 PM     Chlamydia:  Lab Results   Component Value Date/Time    CTRNA Negative 2025 09:37 AM     Gonorrhea:  Lab Results   Component Value Date/Time    FACVSPECIMEN Negative 2025 09:37 AM      Trichomoniasis:  Lab Results   Component Value Date/Time    TRVGRNA Negative 2025 09:37 AM      Last PAP Date: 10/7/2024      Assessment/Plan:  1. Fetal hydronephrosis during pregnancy, antepartum, other fetus  Educated  Reviewed MFM note w/ pt  No future [facility] appts have been made        3/10/25- Left 12.1mm. Peds urology reached out to schedule appt but unable to get in touch with patient. Recommend  evaluation      2/10/25- Persistent left sided renal pelvic dilation noted (1.02cm). Measurements of right kidney within normal limits for current gestational age. Referred to pediatric urology     25- Persistent mild pyelectasis (now bilateral L-6mm, R-4.3mm). AFV normal.         2. Pyelectasis of fetus on prenatal ultrasound  Mild left sided renal pelvic dilation measuring 6.6 mm was noted consistent with UTDA1. The kidneys appear normal as does the bladder. She has a low risk cfDNA and this is a male fetus.     3. Large for gestational age fetus affecting mother, antepartum, third trimester, single gestation  Educated  Reviewed MFM note w/ pt  Routine 1hr glucose 136     3/10/25- Overall EFW 5#9oz at 33w5d - measuring 2w2d ahead.  She was offered to return for repeat growth assessment but declines due to work schedule.      2/10/25 - Overall EFW 4#1oz at 29w5d - measuring 2 weeks ahead. AC 96%  Routine 1h glucola - 136. With borderline 1h glucola and accelerated growth velocity, we recommend blood sugar trends x 1 week. If all normal, may discontinue checks.     4.  Anemia affecting pregnancy in second trimester  Anemia is defined by the WHO as hemoglobin less than 11 g/dL with the equivalent of lesser hematocrit of 33%. It is present in the about 30% of reproductive age females, and 40% of print individuals. The 2 most common causes of anemia in pregnancy are physiologic anemia of pregnancy and  iron-deficiency. Much less common causes of anemia include hemoglobinopathies (sickle cell, thalassemia), RBC membrane disorders (hereditary spherocytosis and had hereditary Elliptocytosis), and acquired anemias (folate deficiency, vitamin B12 deficiency, other vitamin deficiencies, autoimmune hemolytic anemia, hypothyroidism and chronic kidney disease). Anemia in pregnancy is associated with the twofold increase in  delivery and 3 fold increase in delivering a low birth weight baby.     Educated    Ferrous sulfate 325 mg twice daily, vitamin-C 250 mg twice daily, and folic acid 1 mg daily    24  Iron: 28  TIBC: 336  Ferritin: 4.36     Lab Results   Component Value Date    HGB 9.4 (L) 2025    HGB 10.8 (L) 2024    HCT 31.2 (L) 2025    HCT 34.3 (L) 2024       5. Obesity in pregnancy, antepartum  Patient educated on risks and complications of obesity and pregnancy including but not limited to first trimester miscarriage recurrent miscarriages, congenital anomalies, HTN disorders, gestational diabetes, macrosomia, PTL&D, higher risk of fetal demise in-utero and higher risk of CS (and wound complication including infection breakdown) with obesity.     The [place] preventative task force placed the following recommendations in 2016 which ACOG supports regarding utilization of low dose 81mg ASA starting at 14 weeks and continuing through pregnancy in high risk pregnant women. The utilization of this has been shown to reduce the risk for preeclampsia by 24% in clinical trials and reduce the risk of  birth by 14% and IUGR about 20%.      Cont 81mg ASA      Recommendations:  TWG goal is 11-20 lbs  Screen for signs/symptoms of obstructive sleep apnea  Nutritionist consult offered (this is to be ordered by primary OB provider)  Consider early 1 hr GCT (passed); repeat at 24-28 weeks gestation  Continue low dose aspirin 81 mg daily at 12-16 weeks for preeclampsia risk reduction  Targeted anatomical survey scheduled at 18-20 weeks - started today, some views limited  Fetal growth ultrasound at 32 weeks if BMI >= 40  Weekly  testing at 32 weeks if pre-pregnancy BMI >= 45  Lovenox 40 mg BID for VTE prophylaxis while admitted to the hospital (antepartum or postpartum) if BMI >= 40.   Encouraged breastfeeding  Postpartum lifestyle modifications & weight loss     6. Anxiety in pregnancy, antepartum  Educated  Controlled w/ Buspar 7.5 mg, refills sent out  Counseling information given for [facility] [phone]  HI & SI precautions  Advised to call if symptoms persist     7. 35 weeks gestation of pregnancy   Discussed that should any of the following symptoms occur during pregnancy, patient should contact the office immediately or go to the ER after business hours: spotting or bleeding, cramping, painful urination, severe vomiting, pain in one leg or calf, sudden gush of fluid, decrease or absence of fetal movement, sudden weight gain, periorbital or facial swelling, headache with visual changes and/or photophobia.       Discussed with patient travel precautions.       Discussed risks and complications of vaginal delivery and . Reviewed delivery process including induction, c/s and anesthesia. Explained preadmit process. Gave labor precautions and advised to go to OB unit if rupture of membranes occurs, pt. experiences bleeding, contractions get closer than five minutes apart, or pt. notices lack of or decrease in fetal movement. Pt. voices understanding and gives informed consent.       GBS and gc/cz/tv collected  Growth scan next week at  hospital  RTC 1 week for NST      Future Appointments   Date Time Provider Department Center   3/27/2025  8:30 AM L&D PRE-ADMIT, [facility]   3/31/2025  9:30 AM Geri Noriega MD [facility]         This note was transcribed by Brisa Lundberg MA. There may be transcription errors as a result, however minimal. I agree with transcription and every effort has been made to ensure accuracy of transcription, but any obvious errors or omissions should be clarified with the author of the document.               [1]   Current Outpatient Medications:     acetaminophen (TYLENOL) 500 MG tablet, Take 500 mg by mouth every 6 (six) hours as needed for Pain., Disp: , Rfl:     aspirin (ECOTRIN) 81 MG EC tablet, Take 1 tablet (81 mg total) by mouth once daily., Disp: 150 tablet, Rfl: 1    busPIRone (BUSPAR) 7.5 MG tablet, Take 1 tablet (7.5 mg total) by mouth 2 (two) times a day., Disp: 60 tablet, Rfl: 5    ferrous sulfate (FEOSOL) Tab tablet, Take 1 tablet by mouth daily with breakfast., Disp: , Rfl:     hydrOXYzine pamoate (VISTARIL) 50 MG Cap, Take 1 capsule (50 mg total) by mouth every 6 (six) hours as needed (anxiety)., Disp: 30 capsule, Rfl: 1    prenatal no115/iron/folic acid (PRENATAL 19 ORAL), Take by mouth., Disp: , Rfl:   [2]   Social History  Socioeconomic History    Marital status: Single   Tobacco Use    Smoking status: Never     Passive exposure: Never    Smokeless tobacco: Never   Substance and Sexual Activity    Alcohol use: Never    Drug use: Never    Sexual activity: Yes     Partners: Male     Birth control/protection: None     Comment: Only ever been with my current partner. Previous depo shot

## 2025-03-19 ENCOUNTER — PATIENT MESSAGE (OUTPATIENT)
Dept: OTHER | Facility: OTHER | Age: 25
End: 2025-03-19
Payer: COMMERCIAL

## 2025-03-25 ENCOUNTER — CLINICAL SUPPORT (OUTPATIENT)
Dept: OBSTETRICS AND GYNECOLOGY | Facility: CLINIC | Age: 25
End: 2025-03-25
Payer: COMMERCIAL

## 2025-03-25 VITALS
SYSTOLIC BLOOD PRESSURE: 122 MMHG | DIASTOLIC BLOOD PRESSURE: 80 MMHG | BODY MASS INDEX: 53.92 KG/M2 | HEIGHT: 62 IN | TEMPERATURE: 98 F | WEIGHT: 293 LBS

## 2025-03-25 DIAGNOSIS — Z3A.35 35 WEEKS GESTATION OF PREGNANCY: ICD-10-CM

## 2025-03-25 DIAGNOSIS — O35.EXX9: Primary | ICD-10-CM

## 2025-03-25 DIAGNOSIS — O99.210 OBESITY IN PREGNANCY, ANTEPARTUM: ICD-10-CM

## 2025-03-25 DIAGNOSIS — O99.340 ANXIETY IN PREGNANCY, ANTEPARTUM: ICD-10-CM

## 2025-03-25 DIAGNOSIS — O99.012 ANEMIA AFFECTING PREGNANCY IN SECOND TRIMESTER: ICD-10-CM

## 2025-03-25 DIAGNOSIS — F41.9 ANXIETY IN PREGNANCY, ANTEPARTUM: ICD-10-CM

## 2025-03-25 DIAGNOSIS — O36.63X0 LARGE FOR GESTATIONAL AGE FETUS AFFECTING MOTHER, ANTEPARTUM, THIRD TRIMESTER, SINGLE GESTATION: ICD-10-CM

## 2025-03-25 DIAGNOSIS — O35.EXX0 PYELECTASIS OF FETUS ON PRENATAL ULTRASOUND: ICD-10-CM

## 2025-03-25 LAB
BILIRUB UR QL STRIP: ABNORMAL
GLUCOSE UR QL STRIP: NEGATIVE
KETONES UR QL STRIP: ABNORMAL
LEUKOCYTE ESTERASE UR QL STRIP: POSITIVE
PH, POC UA: ABNORMAL
POC BLOOD, URINE: ABNORMAL
POC NITRATES, URINE: POSITIVE
PROT UR QL STRIP: NEGATIVE
SP GR UR STRIP: ABNORMAL (ref 1–1.03)
UROBILINOGEN UR STRIP-ACNC: ABNORMAL (ref 0.3–2.2)

## 2025-03-25 PROCEDURE — 87653 STREP B DNA AMP PROBE: CPT | Performed by: OBSTETRICS & GYNECOLOGY

## 2025-03-25 PROCEDURE — 87591 N.GONORRHOEAE DNA AMP PROB: CPT | Performed by: OBSTETRICS & GYNECOLOGY

## 2025-03-25 RX ORDER — OXYTOCIN/0.9 % SODIUM CHLORIDE 15/250 ML
0-32 PLASTIC BAG, INJECTION (ML) INTRAVENOUS CONTINUOUS
OUTPATIENT
Start: 2025-03-25

## 2025-03-25 RX ORDER — OXYTOCIN-SODIUM CHLORIDE 0.9% IV SOLN 30 UNIT/500ML 30-0.9/5 UT/ML-%
10 SOLUTION INTRAVENOUS ONCE
OUTPATIENT
Start: 2025-03-25 | End: 2025-03-25

## 2025-03-25 RX ORDER — CARBOPROST TROMETHAMINE 250 UG/ML
250 INJECTION, SOLUTION INTRAMUSCULAR
OUTPATIENT
Start: 2025-03-25

## 2025-03-25 RX ORDER — MISOPROSTOL 100 UG/1
800 TABLET ORAL ONCE AS NEEDED
OUTPATIENT
Start: 2025-03-25 | End: 2036-08-21

## 2025-03-25 RX ORDER — SODIUM CHLORIDE 9 MG/ML
INJECTION, SOLUTION INTRAVENOUS
OUTPATIENT
Start: 2025-03-25

## 2025-03-25 RX ORDER — PENICILLIN G 3000000 [IU]/50ML
3 INJECTION, SOLUTION INTRAVENOUS
OUTPATIENT
Start: 2025-03-25

## 2025-03-25 RX ORDER — DIPHENOXYLATE HYDROCHLORIDE AND ATROPINE SULFATE 2.5; .025 MG/1; MG/1
2 TABLET ORAL EVERY 6 HOURS PRN
OUTPATIENT
Start: 2025-03-25

## 2025-03-25 RX ORDER — OXYTOCIN-SODIUM CHLORIDE 0.9% IV SOLN 30 UNIT/500ML 30-0.9/5 UT/ML-%
30 SOLUTION INTRAVENOUS ONCE AS NEEDED
OUTPATIENT
Start: 2025-03-25 | End: 2036-08-21

## 2025-03-25 RX ORDER — MISOPROSTOL 100 UG/1
800 TABLET ORAL ONCE AS NEEDED
OUTPATIENT
Start: 2025-03-25

## 2025-03-25 RX ORDER — LIDOCAINE HYDROCHLORIDE 10 MG/ML
10 INJECTION, SOLUTION INFILTRATION; PERINEURAL ONCE AS NEEDED
OUTPATIENT
Start: 2025-03-25 | End: 2036-08-21

## 2025-03-25 RX ORDER — MISOPROSTOL 100 MCG
25 TABLET ORAL EVERY 4 HOURS PRN
OUTPATIENT
Start: 2025-03-25

## 2025-03-25 RX ORDER — SODIUM CHLORIDE, SODIUM LACTATE, POTASSIUM CHLORIDE, CALCIUM CHLORIDE 600; 310; 30; 20 MG/100ML; MG/100ML; MG/100ML; MG/100ML
INJECTION, SOLUTION INTRAVENOUS CONTINUOUS
OUTPATIENT
Start: 2025-03-25

## 2025-03-25 RX ORDER — CEFAZOLIN SODIUM 2 G/50ML
2 SOLUTION INTRAVENOUS ONCE AS NEEDED
OUTPATIENT
Start: 2025-03-25 | End: 2036-08-21

## 2025-03-25 RX ORDER — METHYLERGONOVINE MALEATE 0.2 MG/ML
200 INJECTION INTRAVENOUS ONCE AS NEEDED
OUTPATIENT
Start: 2025-03-25 | End: 2036-08-21

## 2025-03-25 RX ORDER — BUTORPHANOL TARTRATE 2 MG/ML
2 INJECTION INTRAMUSCULAR; INTRAVENOUS
OUTPATIENT
Start: 2025-03-25

## 2025-03-25 RX ORDER — MUPIROCIN 20 MG/G
OINTMENT TOPICAL
OUTPATIENT
Start: 2025-03-25

## 2025-03-25 RX ORDER — SIMETHICONE 80 MG
1 TABLET,CHEWABLE ORAL 4 TIMES DAILY PRN
OUTPATIENT
Start: 2025-03-25

## 2025-03-25 RX ORDER — BUTORPHANOL TARTRATE 1 MG/ML
1 INJECTION INTRAMUSCULAR; INTRAVENOUS
OUTPATIENT
Start: 2025-03-25

## 2025-03-25 RX ORDER — OXYTOCIN/0.9 % SODIUM CHLORIDE 15/250 ML
95 PLASTIC BAG, INJECTION (ML) INTRAVENOUS ONCE AS NEEDED
OUTPATIENT
Start: 2025-03-25 | End: 2036-08-21

## 2025-03-25 RX ORDER — OXYTOCIN/0.9 % SODIUM CHLORIDE 15/250 ML
95 PLASTIC BAG, INJECTION (ML) INTRAVENOUS ONCE
OUTPATIENT
Start: 2025-03-25 | End: 2025-03-25

## 2025-03-25 RX ORDER — ACETAMINOPHEN 325 MG/1
650 TABLET ORAL EVERY 6 HOURS PRN
OUTPATIENT
Start: 2025-03-25

## 2025-03-25 RX ORDER — CALCIUM CARBONATE 200(500)MG
500 TABLET,CHEWABLE ORAL 3 TIMES DAILY PRN
OUTPATIENT
Start: 2025-03-25

## 2025-03-25 RX ORDER — PROCHLORPERAZINE EDISYLATE 5 MG/ML
5 INJECTION INTRAMUSCULAR; INTRAVENOUS EVERY 6 HOURS PRN
OUTPATIENT
Start: 2025-03-25

## 2025-03-25 RX ORDER — TERBUTALINE SULFATE 1 MG/ML
0.25 INJECTION SUBCUTANEOUS
OUTPATIENT
Start: 2025-03-25

## 2025-03-25 RX ORDER — TRANEXAMIC ACID 10 MG/ML
1000 INJECTION, SOLUTION INTRAVENOUS EVERY 30 MIN PRN
OUTPATIENT
Start: 2025-03-25

## 2025-03-25 RX ORDER — OXYTOCIN 10 [USP'U]/ML
10 INJECTION, SOLUTION INTRAMUSCULAR; INTRAVENOUS ONCE AS NEEDED
OUTPATIENT
Start: 2025-03-25 | End: 2036-08-21

## 2025-03-25 RX ORDER — ONDANSETRON 4 MG/1
8 TABLET, ORALLY DISINTEGRATING ORAL EVERY 8 HOURS PRN
OUTPATIENT
Start: 2025-03-25

## 2025-03-26 LAB
C TRACH RRNA SPEC QL NAA+PROBE: NEGATIVE
N GONORRHOEA RRNA SPEC QL NAA+PROBE: NEGATIVE
SPECIMEN SOURCE: NORMAL
SPECIMEN SOURCE: NORMAL
STREP B PCR (OHS): NOT DETECTED

## 2025-03-27 ENCOUNTER — HOSPITAL ENCOUNTER (OUTPATIENT)
Dept: RADIOLOGY | Facility: HOSPITAL | Age: 25
Discharge: HOME | End: 2025-03-27
Attending: OBSTETRICS & GYNECOLOGY
Payer: COMMERCIAL

## 2025-03-27 ENCOUNTER — HOSPITAL ENCOUNTER (OUTPATIENT)
Dept: PREADMISSION TESTING | Facility: HOSPITAL | Age: 25
Discharge: HOME | End: 2025-03-27
Payer: COMMERCIAL

## 2025-03-27 DIAGNOSIS — O35.EXX9: ICD-10-CM

## 2025-03-27 DIAGNOSIS — O35.EXX0 PYELECTASIS OF FETUS ON PRENATAL ULTRASOUND: ICD-10-CM

## 2025-03-27 DIAGNOSIS — O36.63X0 LARGE FOR GESTATIONAL AGE FETUS AFFECTING MOTHER, ANTEPARTUM, THIRD TRIMESTER, SINGLE GESTATION: ICD-10-CM

## 2025-03-27 DIAGNOSIS — O99.012 ANEMIA AFFECTING PREGNANCY IN SECOND TRIMESTER: ICD-10-CM

## 2025-03-27 DIAGNOSIS — O99.210 OBESITY IN PREGNANCY, ANTEPARTUM: ICD-10-CM

## 2025-03-27 LAB
BASOPHILS # BLD AUTO: 0.02 X10(3)/MCL (ref 0.01–0.08)
BASOPHILS NFR BLD AUTO: 0.2 % (ref 0.1–1.2)
EOSINOPHIL # BLD AUTO: 0.06 X10(3)/MCL (ref 0.04–0.36)
EOSINOPHIL NFR BLD AUTO: 0.6 % (ref 0.7–7)
ERYTHROCYTE [DISTWIDTH] IN BLOOD BY AUTOMATED COUNT: 19.4 % (ref 11–14.5)
HBV SURFACE AG SERPL QL IA: NEGATIVE
HBV SURFACE AG SERPL QL IA: NORMAL
HCT VFR BLD AUTO: 31.2 % (ref 36–48)
HGB BLD-MCNC: 9.6 G/DL (ref 11.8–16)
IMM GRANULOCYTES # BLD AUTO: 0.25 X10(3)/MCL (ref 0–0.03)
IMM GRANULOCYTES NFR BLD AUTO: 2.4 % (ref 0–0.5)
LYMPHOCYTES # BLD AUTO: 1.09 X10(3)/MCL (ref 1.16–3.74)
LYMPHOCYTES NFR BLD AUTO: 10.7 % (ref 20–55)
MCH RBC QN AUTO: 24.1 PG (ref 27–34)
MCHC RBC AUTO-ENTMCNC: 30.8 G/DL (ref 31–37)
MCV RBC AUTO: 78.2 FL (ref 79–99)
MONOCYTES # BLD AUTO: 0.53 X10(3)/MCL (ref 0.24–0.36)
MONOCYTES NFR BLD AUTO: 5.2 % (ref 4.7–12.5)
NEUTROPHILS # BLD AUTO: 8.27 X10(3)/MCL (ref 1.56–6.13)
NEUTROPHILS NFR BLD AUTO: 80.9 % (ref 37–73)
NRBC BLD AUTO-RTO: 0.3 %
PLATELET # BLD AUTO: 259 X10(3)/MCL (ref 140–371)
PMV BLD AUTO: 10.8 FL (ref 9.4–12.4)
RBC # BLD AUTO: 3.99 X10(6)/MCL (ref 4–5.1)
T PALLIDUM AB SER QL: NONREACTIVE
WBC # BLD AUTO: 10.22 X10(3)/MCL (ref 4–11.5)

## 2025-03-27 PROCEDURE — 85025 COMPLETE CBC W/AUTO DIFF WBC: CPT | Performed by: OBSTETRICS & GYNECOLOGY

## 2025-03-27 PROCEDURE — 76805 OB US >/= 14 WKS SNGL FETUS: CPT | Mod: TC

## 2025-03-27 PROCEDURE — 87340 HEPATITIS B SURFACE AG IA: CPT | Performed by: OBSTETRICS & GYNECOLOGY

## 2025-03-27 PROCEDURE — 86780 TREPONEMA PALLIDUM: CPT | Performed by: OBSTETRICS & GYNECOLOGY

## 2025-03-27 PROCEDURE — 87389 HIV-1 AG W/HIV-1&-2 AB AG IA: CPT | Performed by: OBSTETRICS & GYNECOLOGY

## 2025-03-27 NOTE — DISCHARGE INSTRUCTIONS
Things to Bring to the Hospital:    To help make your stay as comfortable as possible you should bring the following items when you come to the hospital to deliver your baby:    1.  TOOTHBRUSH  2.  TOOTHPASTE  3.  SHAMPOO/CONDITIONER   4.  DEODORANT  5.  HAIRBRUSH OR COMB  6.  UNDERGARMENTS  7.  SOAP OR BODY WASH  8.  SANITARY NAPKINS/OVERNIGHTS IF YOU PREFER TO USE YOUR OWN PRODUCTS.   9.  NURSING PADS FOR YOUR BRA AND A BREAST PUMP IF YOU OWN ONE.     Other items needed for mom and baby include:    1.  Bras (2-3) should be packed and should be ones that were worn during pregnancy or nursing bras for breastfeeding.   2.  Gowns, slippers, and a robe.  3.  One outfit for baby to go home in as well as a blanket.  The hospital will provide baby clothes, blankets, diapers, and wipes during hospital stay.    4.  Federally approved car seat is required for the infant to go home at discharge.     OB VISITATION POLICY    The OB Department is open to family for patient visitation.  Smoking is not allowed on the premises.  Visitors must check with staff before entering a patient's room if there is a sign posted on the door or in the lay.   Visitors may be in the room with baby if they are free from infection or any signs and symptoms of illness.  All visitors must use hand  or soap and water before touching the baby.   No children under age 12 are allowed to sleep overnight or to be left unattended in room.   Our rooms only accommodate one overnight guest.  While in labor and delivery room, you will be allowed 2 support persons if you choose and one additional guest will be allowed at the discretion of the physician or nurse.   The 2-3 labor and delivery support people may be interchangeable at the discretion of the OB nurse in charge of your care.   Any other visitors will be directed to the waiting room or post-partum room until after delivery.   No one will be allowed to stay in the labor and delivery  [facility].  After delivery, visitors will not be limited unless a procedure is in progress or your conditions warrants it.    Videos and photographs are not allowed to be taken during the delivery process. They may be taken after the baby is born and declared in good condition by nursing staff or physician.   If you will be having a delivery by , you will be allowed (1) support person in operating room unless the procedure is an emergency or under general anesthesia. No video or photography is allowed until after the procedure is complete.  The support person present will be asked to leave the operating room with the baby after delivery or if any problems arise.   Family and friends should be made familiar with the visitor policy for our facility, so that we may insure that the safety and well-being of you and your baby during this very important time.

## 2025-03-28 LAB — HIV 1+2 AB+HIV1 P24 AG SERPL QL IA: NONREACTIVE

## 2025-03-28 NOTE — PROGRESS NOTES
"Chief Complaint:  Routine Prenatal Visit (36.5 weeks)    History of Present Illness:  Cait Virk is a 24 y.o. year old  at 36w5d presents for her ob exam. She is doing well. +fetal movement. Denies vaginal bleeding, vaginal discharge, LOF or contractions. Negative preeclampsia ROS.        Review of Systems:  General/Constitutional: Fever denies. Headache denies.    Skin: Rash denies.   Respiratory: Cough denies. SOB denies. Wheezing denies .   Cardiovascular: Chest pain denies. Dizziness denies. Palpitations denies. Swelling in hands/feet denies.    Gastrointestinal: Abdominal pain denies. Constipation denies. Diarrhea denies. Heartburn denies. Nausea denies. Vomiting denies.    Genitourinary: Painful urination denies.   Gynecologic: Vaginal bleeding denies. Vaginal discharge denies. Leaking amniotic fluid denies. Contractions denies.    Psychiatric: Depressed mood denies. Suicidal thoughts denies.     Current Medications[1]      Physical Exam:  /82   Temp 97.7 °F (36.5 °C) (Temporal)   Ht 5' 2" (1.575 m)   Wt (!) 145.2 kg (320 lb)   LMP 2024 (Exact Date)   BMI 58.53 kg/m²       Constitutional: General appearance: healthy, well-nourished and well-developed   Psychiatric:  Orientation to time, place and person. Normal mood and affect and active, alert   Abdomen: Auscultation/Inspection/Palpation: Gravid. No tenderness or masses. Soft, nondistended   Extremities: no CCE      NST:   - Baseline: 150s  - Variability: moderate  - Accelerations: PRESENT: 15X15   - Decelerations: ABSENT  - Time on monitor: 20 minutes  - Category: .reactive, category 1    [place]:   - CTX: ABSENT        Assessment/Plan  1. Elevated BP reading  Educated  Asymptomatic   Neg protein today     Prenatal    BP   2025 147/86 !     146/82 !    2025 130/78    2025 130/70     129/57 !    2/10/2025 138/84    2025 128/82    3/10/2025 117/69    3/18/2025 124/78    3/19/2025 --    3/25/2025 122/80    3/27/2025 " --    3/31/2025 146/88 !     136/82         2. Fetal hydronephrosis during pregnancy, antepartum, other fetus  Educated  Reviewed MFM note w/ pt  No future [facility] appts have been made        3/10/25- Left 12.1mm. Peds urology reached out to schedule appt but unable to get in touch with patient. Recommend  evaluation      2/10/25- Persistent left sided renal pelvic dilation noted (1.02cm). Measurements of right kidney within normal limits for current gestational age. Referred to pediatric urology     25- Persistent mild pyelectasis (now bilateral L-6mm, R-4.3mm). AFV normal.         2. Pyelectasis of fetus on prenatal ultrasound  Mild left sided renal pelvic dilation measuring 6.6 mm was noted consistent with UTDA1. The kidneys appear normal as does the bladder. She has a low risk cfDNA and this is a male fetus.     3. Large for gestational age fetus affecting mother, antepartum, third trimester, single gestation  Educated  Reviewed MFM note w/ pt  Routine 1hr glucose 136     3/10/25- Overall EFW 5#9oz at 33w5d - measuring 2w2d ahead.  She was offered to return for repeat growth assessment but declines due to work schedule.      2/10/25 - Overall EFW 4#1oz at 29w5d - measuring 2 weeks ahead. AC 96%  Routine 1h glucola - 136. With borderline 1h glucola and accelerated growth velocity, we recommend blood sugar trends x 1 week. If all normal, may discontinue checks.     4. Anemia affecting pregnancy in third trimester  Anemia is defined by the WHO as hemoglobin less than 11 g/dL with the equivalent of lesser hematocrit of 33%. It is present in the about 30% of reproductive age females, and 40% of print individuals. The 2 most common causes of anemia in pregnancy are physiologic anemia of pregnancy and  iron-deficiency. Much less common causes of anemia include hemoglobinopathies (sickle cell, thalassemia), RBC membrane disorders (hereditary spherocytosis and had hereditary Elliptocytosis), and acquired  anemias (folate deficiency, vitamin B12 deficiency, other vitamin deficiencies, autoimmune hemolytic anemia, hypothyroidism and chronic kidney disease). Anemia in pregnancy is associated with the twofold increase in  delivery and 3 fold increase in delivering a low birth weight baby.     Educated    Ferrous sulfate 325 mg twice daily, vitamin-C 250 mg twice daily, and folic acid 1 mg daily    24  Iron: 28  TIBC: 336  Ferritin: 4.36     Lab Results   Component Value Date    HGB 9.6 (L) 2025    HGB 9.4 (L) 2025    HCT 31.2 (L) 2025    HCT 31.2 (L) 2025       5. Obesity in pregnancy, antepartum  Patient educated on risks and complications of obesity and pregnancy including but not limited to first trimester miscarriage recurrent miscarriages, congenital anomalies, HTN disorders, gestational diabetes, macrosomia, PTL&D, higher risk of fetal demise in-utero and higher risk of CS (and wound complication including infection breakdown) with obesity.     Cont 81mg ASA     Recommendations:  TWG goal is 11-20 lbs  Screen for signs/symptoms of obstructive sleep apnea  Nutritionist consult offered (this is to be ordered by primary OB provider)  Consider early 1 hr GCT (passed); repeat at 24-28 weeks gestation  Continue low dose aspirin 81 mg daily at 12-16 weeks for preeclampsia risk reduction  Targeted anatomical survey scheduled at 18-20 weeks - started today, some views limited  Fetal growth ultrasound at 32 weeks if BMI >= 40  Weekly  testing at 32 weeks if pre-pregnancy BMI >= 45  Lovenox 40 mg BID for VTE prophylaxis while admitted to the hospital (antepartum or postpartum) if BMI >= 40.   Encouraged breastfeeding  Postpartum lifestyle modifications & weight loss     6. Anxiety in pregnancy, antepartum  Educated  Controlled w/ Buspar 7.5 mg, refills sent out  Counseling information given for [facility] [phone]  HI & SI precautions  Advised to call if symptoms  persist     7. 36 weeks gestation of pregnancy  Discussed that should any of the following symptoms occur during pregnancy, patient should contact the office immediately or go to the ER after business hours: spotting or bleeding, cramping, painful urination, severe vomiting, pain in one leg or calf, sudden gush of fluid, decrease or absence of fetal movement, sudden weight gain, periorbital or facial swelling, headache with visual changes and/or photophobia.       Discussed with patient travel precautions.  RTC 1 week for NST and BP check      Educated on compliance of future appts  Future Appointments   Date Time Provider Department Center   4/8/2025  8:30 AM Geri Noriega MD [facility]         This note was transcribed by Brisa Lundberg MA. There may be transcription errors as a result, however minimal. I agree with transcription and every effort has been made to ensure accuracy of transcription, but any obvious errors or omissions should be clarified with the author of the document.               [1]   Current Outpatient Medications:     acetaminophen (TYLENOL) 500 MG tablet, Take 500 mg by mouth every 6 (six) hours as needed for Pain., Disp: , Rfl:     aspirin (ECOTRIN) 81 MG EC tablet, Take 1 tablet (81 mg total) by mouth once daily., Disp: 150 tablet, Rfl: 1    busPIRone (BUSPAR) 7.5 MG tablet, Take 1 tablet (7.5 mg total) by mouth 2 (two) times a day., Disp: 60 tablet, Rfl: 5    ferrous sulfate (FEOSOL) Tab tablet, Take 1 tablet by mouth daily with breakfast., Disp: , Rfl:     hydrOXYzine pamoate (VISTARIL) 50 MG Cap, Take 1 capsule (50 mg total) by mouth every 6 (six) hours as needed (anxiety)., Disp: 30 capsule, Rfl: 1    prenatal no115/iron/folic acid (PRENATAL 19 ORAL), Take by mouth., Disp: , Rfl:

## 2025-03-31 ENCOUNTER — CLINICAL SUPPORT (OUTPATIENT)
Dept: OBSTETRICS AND GYNECOLOGY | Facility: CLINIC | Age: 25
End: 2025-03-31
Payer: COMMERCIAL

## 2025-03-31 VITALS
TEMPERATURE: 98 F | WEIGHT: 293 LBS | DIASTOLIC BLOOD PRESSURE: 82 MMHG | HEIGHT: 62 IN | BODY MASS INDEX: 53.92 KG/M2 | SYSTOLIC BLOOD PRESSURE: 136 MMHG

## 2025-03-31 DIAGNOSIS — F41.9 ANXIETY IN PREGNANCY, ANTEPARTUM: ICD-10-CM

## 2025-03-31 DIAGNOSIS — O99.340 ANXIETY IN PREGNANCY, ANTEPARTUM: ICD-10-CM

## 2025-03-31 DIAGNOSIS — O35.EXX9: Primary | ICD-10-CM

## 2025-03-31 DIAGNOSIS — O99.013 ANEMIA AFFECTING PREGNANCY IN THIRD TRIMESTER: ICD-10-CM

## 2025-03-31 DIAGNOSIS — O35.EXX0 PYELECTASIS OF FETUS ON PRENATAL ULTRASOUND: ICD-10-CM

## 2025-03-31 DIAGNOSIS — O99.210 OBESITY IN PREGNANCY, ANTEPARTUM: ICD-10-CM

## 2025-03-31 DIAGNOSIS — O36.63X0 LARGE FOR GESTATIONAL AGE FETUS AFFECTING MOTHER, ANTEPARTUM, THIRD TRIMESTER, SINGLE GESTATION: ICD-10-CM

## 2025-03-31 DIAGNOSIS — R03.0 ELEVATED BLOOD PRESSURE READING: ICD-10-CM

## 2025-03-31 DIAGNOSIS — Z3A.36 36 WEEKS GESTATION OF PREGNANCY: ICD-10-CM

## 2025-03-31 LAB
BILIRUB UR QL STRIP: ABNORMAL
GLUCOSE UR QL STRIP: NEGATIVE
KETONES UR QL STRIP: ABNORMAL
LEUKOCYTE ESTERASE UR QL STRIP: POSITIVE
PH, POC UA: ABNORMAL
POC BLOOD, URINE: ABNORMAL
POC NITRATES, URINE: NEGATIVE
PROT UR QL STRIP: NEGATIVE
SP GR UR STRIP: ABNORMAL (ref 1–1.03)
UROBILINOGEN UR STRIP-ACNC: ABNORMAL (ref 0.3–2.2)

## 2025-03-31 PROCEDURE — 59025 FETAL NON-STRESS TEST: CPT | Mod: ,,, | Performed by: OBSTETRICS & GYNECOLOGY

## 2025-03-31 PROCEDURE — 0502F SUBSEQUENT PRENATAL CARE: CPT | Mod: CPTII,,, | Performed by: OBSTETRICS & GYNECOLOGY

## 2025-04-03 NOTE — PROGRESS NOTES
"Chief Complaint:  Routine Prenatal Visit    History of Present Illness:  Cait Virk is a 24 y.o. year old  at 37w6d presents for her ob exam. She is doing well. +fetal movement. Denies vaginal bleeding, vaginal discharge, LOF or contractions. Negative preeclampsia ROS.    C/o increased crying spells and anxiety due to current family situation. Currently taking Buspar 7.5 mg BID and Vistaril 50 mg PRN.        Review of Systems:  General/Constitutional: Fever denies. Headache denies.    Skin: Rash denies.   Respiratory: Cough denies. SOB denies. Wheezing denies .   Cardiovascular: Chest pain denies. Dizziness denies. Palpitations denies. Swelling in hands/feet denies.    Gastrointestinal: Abdominal pain denies. Constipation denies. Diarrhea denies. Heartburn denies. Nausea denies. Vomiting denies.    Genitourinary: Painful urination denies.   Gynecologic: Vaginal bleeding denies. Vaginal discharge denies. Leaking amniotic fluid denies. Contractions denies.    Psychiatric: Depressed mood denies. Suicidal thoughts denies.     Current Medications[1]      Physical Exam:  /78 (BP Location: Right arm, Patient Position: Sitting)   Temp 97.2 °F (36.2 °C)   Ht 5' 2" (1.575 m)   Wt (!) 145.2 kg (320 lb 3.2 oz)   LMP 2024 (Exact Date)   BMI 58.57 kg/m²       Chaperone present    Constitutional: General appearance: healthy, well-nourished and well-developed   Psychiatric:  Orientation to time, place and person. Normal mood and affect and active, alert   Abdomen: Auscultation/Inspection/Palpation: Gravid. No tenderness or masses. Soft, nondistended   Extremities: no CCE      Cvx: 1/0/-3  NST:   - Baseline: 140  - Variability: moderate  - Accelerations: PRESENT: 15X15   - Decelerations: ABSENT  - Time on monitor: 20 minutes  - Category: .reactive, category 1    [place]:   - CTX: ABSENT        Assessment/Plan  1. Elevated blood pressure reading in office without diagnosis of hypertension  Educated  BP stable " today    Vitals:    25 0830   BP: 122/78       2. Large for gestational age fetus affecting mother, antepartum, third trimester, single gestation  Educated  Reviewed MFM note w/ pt  Routine 1hr glucose 136  Last growth 3/10/25  Repeat growth today     3/10/25- Overall EFW 5#9oz at 33w5d - measuring 2w2d ahead.  She was offered to return for repeat growth assessment but declines due to work schedule.      2/10/25 - Overall EFW 4#1oz at 29w5d - measuring 2 weeks ahead. AC 96%  Routine 1h glucola - 136. With borderline 1h glucola and accelerated growth velocity, we recommend blood sugar trends x 1 week. If all normal, may discontinue checks.       3. Fetal hydronephrosis during pregnancy, antepartum, other fetus  Educated  Reviewed MFM note w/ pt  No future MFM appts have been made        3/10/25- Left 12.1mm. Peds urology reached out to schedule appt but unable to get in touch with patient. Recommend  evaluation      2/10/25- Persistent left sided renal pelvic dilation noted (1.02cm). Measurements of right kidney within normal limits for current gestational age. Referred to pediatric urology     25- Persistent mild pyelectasis (now bilateral L-6mm, R-4.3mm). AFV normal.         4. Pyelectasis of fetus on prenatal ultrasound  Mild left sided renal pelvic dilation measuring 6.6 mm was noted consistent with UTDA1. The kidneys appear normal as does the bladder. She has a low risk cfDNA and this is a male fetus.     5. Anemia affecting pregnancy in third trimester  Anemia is defined by the WHO as hemoglobin less than 11 g/dL with the equivalent of lesser hematocrit of 33%. It is present in the about 30% of reproductive age females, and 40% of print individuals. The 2 most common causes of anemia in pregnancy are physiologic anemia of pregnancy and  iron-deficiency. Much less common causes of anemia include hemoglobinopathies (sickle cell, thalassemia), RBC membrane disorders (hereditary spherocytosis  and had hereditary Elliptocytosis), and acquired anemias (folate deficiency, vitamin B12 deficiency, other vitamin deficiencies, autoimmune hemolytic anemia, hypothyroidism and chronic kidney disease). Anemia in pregnancy is associated with the twofold increase in  delivery and 3 fold increase in delivering a low birth weight baby.     Educated    Ferrous sulfate 325 mg twice daily, vitamin-C 250 mg twice daily, and folic acid 1 mg daily    24  Iron: 28  TIBC: 336  Ferritin: 4.36     Lab Results   Component Value Date    HGB 9.6 (L) 2025    HGB 9.4 (L) 2025    HCT 31.2 (L) 2025    HCT 31.2 (L) 2025       6. Obesity in pregnancy, antepartum  Patient educated on risks and complications of obesity and pregnancy including but not limited to first trimester miscarriage recurrent miscarriages, congenital anomalies, HTN disorders, gestational diabetes, macrosomia, PTL&D, higher risk of fetal demise in-utero and higher risk of CS (and wound complication including infection breakdown) with obesity.     Cont 81mg ASA     Recommendations:  TWG goal is 11-20 lbs  Screen for signs/symptoms of obstructive sleep apnea  Nutritionist consult offered (this is to be ordered by primary OB provider)  Consider early 1 hr GCT (passed); repeat at 24-28 weeks gestation  Continue low dose aspirin 81 mg daily at 12-16 weeks for preeclampsia risk reduction  Targeted anatomical survey scheduled at 18-20 weeks - started today, some views limited  Fetal growth ultrasound at 32 weeks if BMI >= 40  Weekly  testing at 32 weeks if pre-pregnancy BMI >= 45  Lovenox 40 mg BID for VTE prophylaxis while admitted to the hospital (antepartum or postpartum) if BMI >= 40.   Encouraged breastfeeding  Postpartum lifestyle modifications & weight loss       7. Anxiety in pregnancy, antepartum  Educated  Increased crying spells w/ Buspar 7.5 mg and Vistaril 50 mg PRN  Increased Buspar to 10 mg BID  HI & SI  precautions  Follow up mood check next week     8. 37 weeks gestation of pregnancy  Discussed that should any of the following symptoms occur during pregnancy, patient should contact the office immediately or go to the ER after business hours: spotting or bleeding, cramping, painful urination, severe vomiting, pain in one leg or calf, sudden gush of fluid, decrease or absence of fetal movement, sudden weight gain, periorbital or facial swelling, headache with visual changes and/or photophobia.       Discussed with patient travel precautions.  Growth scan today at hospital  RT Tuesday for NST and mood check      Educated on compliance of future appts  No future appointments.      This note was transcribed by Brisa Lundberg MA. There may be transcription errors as a result, however minimal. I agree with transcription and every effort has been made to ensure accuracy of transcription, but any obvious errors or omissions should be clarified with the author of the document.               [1]   Current Outpatient Medications:     acetaminophen (TYLENOL) 500 MG tablet, Take 500 mg by mouth every 6 (six) hours as needed for Pain., Disp: , Rfl:     aspirin (ECOTRIN) 81 MG EC tablet, Take 1 tablet (81 mg total) by mouth once daily., Disp: 150 tablet, Rfl: 1    ferrous sulfate (FEOSOL) Tab tablet, Take 1 tablet by mouth daily with breakfast., Disp: , Rfl:     hydrOXYzine pamoate (VISTARIL) 50 MG Cap, Take 1 capsule (50 mg total) by mouth every 6 (six) hours as needed (anxiety)., Disp: 30 capsule, Rfl: 1    prenatal no115/iron/folic acid (PRENATAL 19 ORAL), Take by mouth., Disp: , Rfl:     busPIRone (BUSPAR) 10 MG tablet, Take 1 tablet (10 mg total) by mouth 2 (two) times daily., Disp: 60 tablet, Rfl: 1

## 2025-04-08 ENCOUNTER — HOSPITAL ENCOUNTER (OUTPATIENT)
Dept: RADIOLOGY | Facility: HOSPITAL | Age: 25
Discharge: HOME | End: 2025-04-08
Attending: OBSTETRICS & GYNECOLOGY
Payer: COMMERCIAL

## 2025-04-08 ENCOUNTER — CLINICAL SUPPORT (OUTPATIENT)
Dept: OBSTETRICS AND GYNECOLOGY | Facility: CLINIC | Age: 25
End: 2025-04-08
Payer: COMMERCIAL

## 2025-04-08 VITALS
TEMPERATURE: 97 F | SYSTOLIC BLOOD PRESSURE: 122 MMHG | BODY MASS INDEX: 53.92 KG/M2 | DIASTOLIC BLOOD PRESSURE: 78 MMHG | HEIGHT: 62 IN | WEIGHT: 293 LBS

## 2025-04-08 DIAGNOSIS — O35.EXX9: ICD-10-CM

## 2025-04-08 DIAGNOSIS — F41.9 ANXIETY IN PREGNANCY, ANTEPARTUM: ICD-10-CM

## 2025-04-08 DIAGNOSIS — R03.0 ELEVATED BLOOD PRESSURE READING IN OFFICE WITHOUT DIAGNOSIS OF HYPERTENSION: Primary | ICD-10-CM

## 2025-04-08 DIAGNOSIS — O99.340 ANXIETY IN PREGNANCY, ANTEPARTUM: ICD-10-CM

## 2025-04-08 DIAGNOSIS — O35.EXX0 PYELECTASIS OF FETUS ON PRENATAL ULTRASOUND: ICD-10-CM

## 2025-04-08 DIAGNOSIS — O99.013 ANEMIA AFFECTING PREGNANCY IN THIRD TRIMESTER: ICD-10-CM

## 2025-04-08 DIAGNOSIS — O99.210 OBESITY IN PREGNANCY, ANTEPARTUM: ICD-10-CM

## 2025-04-08 DIAGNOSIS — O36.63X0 LARGE FOR GESTATIONAL AGE FETUS AFFECTING MOTHER, ANTEPARTUM, THIRD TRIMESTER, SINGLE GESTATION: ICD-10-CM

## 2025-04-08 DIAGNOSIS — Z3A.37 37 WEEKS GESTATION OF PREGNANCY: ICD-10-CM

## 2025-04-08 LAB
BILIRUB UR QL STRIP: ABNORMAL
GLUCOSE UR QL STRIP: NEGATIVE
KETONES UR QL STRIP: ABNORMAL
LEUKOCYTE ESTERASE UR QL STRIP: NEGATIVE
PH, POC UA: ABNORMAL
POC BLOOD, URINE: ABNORMAL
POC NITRATES, URINE: NEGATIVE
PROT UR QL STRIP: POSITIVE
SP GR UR STRIP: ABNORMAL (ref 1–1.03)
UROBILINOGEN UR STRIP-ACNC: ABNORMAL (ref 0.3–2.2)

## 2025-04-08 PROCEDURE — 76805 OB US >/= 14 WKS SNGL FETUS: CPT | Mod: TC

## 2025-04-08 RX ORDER — BUSPIRONE HYDROCHLORIDE 10 MG/1
10 TABLET ORAL 2 TIMES DAILY
Qty: 60 TABLET | Refills: 1 | Status: ON HOLD | OUTPATIENT
Start: 2025-04-08 | End: 2026-04-08

## 2025-04-11 ENCOUNTER — HOSPITAL ENCOUNTER (INPATIENT)
Facility: HOSPITAL | Age: 25
LOS: 4 days | Discharge: HOME | End: 2025-04-15
Attending: OBSTETRICS & GYNECOLOGY | Admitting: OBSTETRICS & GYNECOLOGY
Payer: COMMERCIAL

## 2025-04-11 DIAGNOSIS — O35.EXX9: ICD-10-CM

## 2025-04-11 DIAGNOSIS — O99.213 MATERNAL OBESITY SYNDROME IN THIRD TRIMESTER: ICD-10-CM

## 2025-04-11 DIAGNOSIS — Z3A.35 35 WEEKS GESTATION OF PREGNANCY: ICD-10-CM

## 2025-04-11 DIAGNOSIS — O36.63X0 LARGE FOR GESTATIONAL AGE FETUS AFFECTING MOTHER, ANTEPARTUM, THIRD TRIMESTER, SINGLE GESTATION: ICD-10-CM

## 2025-04-11 DIAGNOSIS — O13.3 GESTATIONAL HYPERTENSION, THIRD TRIMESTER: ICD-10-CM

## 2025-04-11 DIAGNOSIS — D50.9 IRON DEFICIENCY ANEMIA DURING PREGNANCY: ICD-10-CM

## 2025-04-11 DIAGNOSIS — Z98.891 STATUS POST CESAREAN SECTION: Primary | ICD-10-CM

## 2025-04-11 DIAGNOSIS — O99.019 IRON DEFICIENCY ANEMIA DURING PREGNANCY: ICD-10-CM

## 2025-04-11 DIAGNOSIS — E66.01 MORBID OBESITY WITH BMI OF 50.0-59.9, ADULT: ICD-10-CM

## 2025-04-11 DIAGNOSIS — Z36.89 ENCOUNTER FOR TRIAGE IN PREGNANT PATIENT: ICD-10-CM

## 2025-04-11 DIAGNOSIS — O99.343 MENTAL DISORDER AFFECTING PREGNANCY IN THIRD TRIMESTER: ICD-10-CM

## 2025-04-11 DIAGNOSIS — Z3A.38 38 WEEKS GESTATION OF PREGNANCY: ICD-10-CM

## 2025-04-11 LAB
ALBUMIN SERPL-MCNC: 3.1 G/DL (ref 3.4–5)
ALBUMIN/GLOB SERPL: 1.2 RATIO
ALP SERPL-CCNC: 180 UNIT/L (ref 50–144)
ALT SERPL-CCNC: 10 UNIT/L (ref 1–45)
ANION GAP SERPL CALC-SCNC: 4 MEQ/L (ref 2–13)
AST SERPL-CCNC: 13 UNIT/L (ref 14–36)
BACTERIA #/AREA URNS AUTO: ABNORMAL /HPF
BASOPHILS # BLD AUTO: 0.02 X10(3)/MCL (ref 0.01–0.08)
BASOPHILS NFR BLD AUTO: 0.2 % (ref 0.1–1.2)
BILIRUB SERPL-MCNC: 0.2 MG/DL (ref 0–1)
BILIRUB UR QL STRIP.AUTO: NEGATIVE
BUN SERPL-MCNC: 7 MG/DL (ref 7–20)
CALCIUM SERPL-MCNC: 8.9 MG/DL (ref 8.4–10.2)
CHLORIDE SERPL-SCNC: 111 MMOL/L (ref 98–110)
CLARITY UR: ABNORMAL
CO2 SERPL-SCNC: 19 MMOL/L (ref 21–32)
COLOR UR AUTO: YELLOW
CREAT SERPL-MCNC: 0.48 MG/DL (ref 0.66–1.25)
CREAT/UREA NIT SERPL: 15 (ref 12–20)
EOSINOPHIL # BLD AUTO: 0.11 X10(3)/MCL (ref 0.04–0.36)
EOSINOPHIL NFR BLD AUTO: 0.9 % (ref 0.7–7)
ERYTHROCYTE [DISTWIDTH] IN BLOOD BY AUTOMATED COUNT: 19.2 % (ref 11–14.5)
GFR SERPLBLD CREATININE-BSD FMLA CKD-EPI: >90 ML/MIN/1.73/M2
GLOBULIN SER-MCNC: 2.6 GM/DL (ref 2–3.9)
GLUCOSE SERPL-MCNC: 101 MG/DL (ref 70–115)
GLUCOSE UR QL STRIP: NEGATIVE
GROUP & RH: NORMAL
HCT VFR BLD AUTO: 30.7 % (ref 36–48)
HGB BLD-MCNC: 9.5 G/DL (ref 11.8–16)
HGB UR QL STRIP: NEGATIVE
IMM GRANULOCYTES # BLD AUTO: 0.07 X10(3)/MCL (ref 0–0.03)
IMM GRANULOCYTES NFR BLD AUTO: 0.6 % (ref 0–0.5)
INDIRECT COOMBS: NORMAL
KETONES UR QL STRIP: NEGATIVE
LDH SERPL-CCNC: 202 U/L (ref 120–246)
LEUKOCYTE ESTERASE UR QL STRIP: ABNORMAL
LYMPHOCYTES # BLD AUTO: 1.7 X10(3)/MCL (ref 1.16–3.74)
LYMPHOCYTES NFR BLD AUTO: 14.4 % (ref 20–55)
MCH RBC QN AUTO: 24.4 PG (ref 27–34)
MCHC RBC AUTO-ENTMCNC: 30.9 G/DL (ref 31–37)
MCV RBC AUTO: 78.7 FL (ref 79–99)
MONOCYTES # BLD AUTO: 0.64 X10(3)/MCL (ref 0.24–0.36)
MONOCYTES NFR BLD AUTO: 5.4 % (ref 4.7–12.5)
MUCOUS THREADS URNS QL MICRO: ABNORMAL /LPF
NEUTROPHILS # BLD AUTO: 9.28 X10(3)/MCL (ref 1.56–6.13)
NEUTROPHILS NFR BLD AUTO: 78.5 % (ref 37–73)
NITRITE UR QL STRIP: NEGATIVE
NRBC BLD AUTO-RTO: 0 %
PH UR STRIP: 6 [PH]
PLATELET # BLD AUTO: 252 X10(3)/MCL (ref 140–371)
PMV BLD AUTO: 10.6 FL (ref 9.4–12.4)
POTASSIUM SERPL-SCNC: 3.9 MMOL/L (ref 3.5–5.1)
PROT SERPL-MCNC: 5.7 GM/DL (ref 6.3–8.2)
PROT UR QL STRIP: NEGATIVE
RBC # BLD AUTO: 3.9 X10(6)/MCL (ref 4–5.1)
RBC #/AREA URNS AUTO: ABNORMAL /HPF
SODIUM SERPL-SCNC: 134 MMOL/L (ref 136–145)
SP GR UR STRIP.AUTO: 1.02 (ref 1–1.03)
SPECIMEN OUTDATE: NORMAL
SQUAMOUS #/AREA URNS AUTO: ABNORMAL /HPF
URATE SERPL-MCNC: 3.6 MG/DL (ref 2.6–7.2)
UROBILINOGEN UR STRIP-ACNC: 0.2
WBC # BLD AUTO: 11.82 X10(3)/MCL (ref 4–11.5)
WBC #/AREA URNS AUTO: ABNORMAL /HPF

## 2025-04-11 PROCEDURE — 83615 LACTATE (LD) (LDH) ENZYME: CPT | Performed by: OBSTETRICS & GYNECOLOGY

## 2025-04-11 PROCEDURE — 81015 MICROSCOPIC EXAM OF URINE: CPT | Performed by: OBSTETRICS & GYNECOLOGY

## 2025-04-11 PROCEDURE — 36415 COLL VENOUS BLD VENIPUNCTURE: CPT | Performed by: OBSTETRICS & GYNECOLOGY

## 2025-04-11 PROCEDURE — 86900 BLOOD TYPING SEROLOGIC ABO: CPT | Performed by: OBSTETRICS & GYNECOLOGY

## 2025-04-11 PROCEDURE — 11000001 HC ACUTE MED/SURG PRIVATE ROOM

## 2025-04-11 PROCEDURE — 84550 ASSAY OF BLOOD/URIC ACID: CPT | Performed by: OBSTETRICS & GYNECOLOGY

## 2025-04-11 PROCEDURE — 85025 COMPLETE CBC W/AUTO DIFF WBC: CPT | Performed by: OBSTETRICS & GYNECOLOGY

## 2025-04-11 PROCEDURE — 81003 URINALYSIS AUTO W/O SCOPE: CPT | Performed by: OBSTETRICS & GYNECOLOGY

## 2025-04-11 PROCEDURE — 87086 URINE CULTURE/COLONY COUNT: CPT | Performed by: OBSTETRICS & GYNECOLOGY

## 2025-04-11 PROCEDURE — 25000003 PHARM REV CODE 250: Performed by: OBSTETRICS & GYNECOLOGY

## 2025-04-11 PROCEDURE — 63600175 PHARM REV CODE 636 W HCPCS: Performed by: OBSTETRICS & GYNECOLOGY

## 2025-04-11 PROCEDURE — 80053 COMPREHEN METABOLIC PANEL: CPT | Performed by: OBSTETRICS & GYNECOLOGY

## 2025-04-11 RX ORDER — PROCHLORPERAZINE EDISYLATE 5 MG/ML
5 INJECTION INTRAMUSCULAR; INTRAVENOUS EVERY 6 HOURS PRN
Status: DISCONTINUED | OUTPATIENT
Start: 2025-04-11 | End: 2025-04-13

## 2025-04-11 RX ORDER — TERBUTALINE SULFATE 1 MG/ML
0.25 INJECTION SUBCUTANEOUS
Status: DISCONTINUED | OUTPATIENT
Start: 2025-04-11 | End: 2025-04-13

## 2025-04-11 RX ORDER — OXYTOCIN/0.9 % SODIUM CHLORIDE 15/250 ML
0-32 PLASTIC BAG, INJECTION (ML) INTRAVENOUS CONTINUOUS
Status: DISCONTINUED | OUTPATIENT
Start: 2025-04-11 | End: 2025-04-13

## 2025-04-11 RX ORDER — ACETAMINOPHEN 325 MG/1
650 TABLET ORAL EVERY 6 HOURS PRN
Status: DISCONTINUED | OUTPATIENT
Start: 2025-04-11 | End: 2025-04-13

## 2025-04-11 RX ORDER — HYDROXYZINE PAMOATE 50 MG/1
50 CAPSULE ORAL EVERY 8 HOURS PRN
Status: DISCONTINUED | OUTPATIENT
Start: 2025-04-11 | End: 2025-04-13

## 2025-04-11 RX ORDER — ONDANSETRON 4 MG/1
8 TABLET, ORALLY DISINTEGRATING ORAL EVERY 8 HOURS PRN
Status: DISCONTINUED | OUTPATIENT
Start: 2025-04-11 | End: 2025-04-13

## 2025-04-11 RX ORDER — CALCIUM CARBONATE 200(500)MG
500 TABLET,CHEWABLE ORAL 3 TIMES DAILY PRN
Status: DISCONTINUED | OUTPATIENT
Start: 2025-04-11 | End: 2025-04-13

## 2025-04-11 RX ORDER — OXYTOCIN-SODIUM CHLORIDE 0.9% IV SOLN 30 UNIT/500ML 30-0.9/5 UT/ML-%
10 SOLUTION INTRAVENOUS ONCE
Status: DISCONTINUED | OUTPATIENT
Start: 2025-04-11 | End: 2025-04-13

## 2025-04-11 RX ORDER — MISOPROSTOL 100 MCG
25 TABLET ORAL EVERY 4 HOURS PRN
Status: DISCONTINUED | OUTPATIENT
Start: 2025-04-11 | End: 2025-04-13

## 2025-04-11 RX ORDER — SIMETHICONE 80 MG
1 TABLET,CHEWABLE ORAL 4 TIMES DAILY PRN
Status: DISCONTINUED | OUTPATIENT
Start: 2025-04-11 | End: 2025-04-13

## 2025-04-11 RX ORDER — SODIUM CHLORIDE, SODIUM LACTATE, POTASSIUM CHLORIDE, CALCIUM CHLORIDE 600; 310; 30; 20 MG/100ML; MG/100ML; MG/100ML; MG/100ML
INJECTION, SOLUTION INTRAVENOUS CONTINUOUS
Status: DISCONTINUED | OUTPATIENT
Start: 2025-04-11 | End: 2025-04-13

## 2025-04-11 RX ORDER — LIDOCAINE HYDROCHLORIDE 10 MG/ML
10 INJECTION, SOLUTION INFILTRATION; PERINEURAL ONCE AS NEEDED
Status: DISCONTINUED | OUTPATIENT
Start: 2025-04-11 | End: 2025-04-13

## 2025-04-11 RX ORDER — MUPIROCIN 20 MG/G
OINTMENT TOPICAL
Status: DISCONTINUED | OUTPATIENT
Start: 2025-04-11 | End: 2025-04-13

## 2025-04-11 RX ORDER — SODIUM CHLORIDE 9 MG/ML
INJECTION, SOLUTION INTRAVENOUS
Status: DISCONTINUED | OUTPATIENT
Start: 2025-04-11 | End: 2025-04-13

## 2025-04-11 RX ORDER — ACETAMINOPHEN 500 MG
500 TABLET ORAL EVERY 6 HOURS PRN
Status: DISCONTINUED | OUTPATIENT
Start: 2025-04-11 | End: 2025-04-13

## 2025-04-11 RX ORDER — BUTORPHANOL TARTRATE 2 MG/ML
2 INJECTION INTRAMUSCULAR; INTRAVENOUS
Status: DISCONTINUED | OUTPATIENT
Start: 2025-04-11 | End: 2025-04-13

## 2025-04-11 RX ORDER — OXYTOCIN/0.9 % SODIUM CHLORIDE 15/250 ML
95 PLASTIC BAG, INJECTION (ML) INTRAVENOUS ONCE
Status: DISCONTINUED | OUTPATIENT
Start: 2025-04-11 | End: 2025-04-13

## 2025-04-11 RX ORDER — BUTORPHANOL TARTRATE 2 MG/ML
1 INJECTION INTRAMUSCULAR; INTRAVENOUS
Status: DISCONTINUED | OUTPATIENT
Start: 2025-04-11 | End: 2025-04-13

## 2025-04-11 RX ADMIN — ACETAMINOPHEN 500 MG: 500 TABLET, FILM COATED ORAL at 08:04

## 2025-04-11 RX ADMIN — SODIUM CHLORIDE, POTASSIUM CHLORIDE, SODIUM LACTATE AND CALCIUM CHLORIDE: 600; 310; 30; 20 INJECTION, SOLUTION INTRAVENOUS at 11:04

## 2025-04-11 RX ADMIN — MISOPROSTOL 25 MCG: 100 TABLET ORAL at 11:04

## 2025-04-12 ENCOUNTER — ANESTHESIA (OUTPATIENT)
Dept: SURGERY | Facility: HOSPITAL | Age: 25
End: 2025-04-12
Payer: COMMERCIAL

## 2025-04-12 ENCOUNTER — ANESTHESIA EVENT (OUTPATIENT)
Dept: SURGERY | Facility: HOSPITAL | Age: 25
End: 2025-04-12
Payer: COMMERCIAL

## 2025-04-12 PROBLEM — O13.3 GESTATIONAL HYPERTENSION, THIRD TRIMESTER: Status: ACTIVE | Noted: 2025-04-12

## 2025-04-12 PROBLEM — R03.0 ELEVATED BLOOD PRESSURE READING IN OFFICE WITHOUT DIAGNOSIS OF HYPERTENSION: Status: RESOLVED | Noted: 2025-01-13 | Resolved: 2025-04-12

## 2025-04-12 PROBLEM — Z3A.38 38 WEEKS GESTATION OF PREGNANCY: Status: ACTIVE | Noted: 2025-04-12

## 2025-04-12 PROBLEM — O99.213 MATERNAL OBESITY SYNDROME IN THIRD TRIMESTER: Status: ACTIVE | Noted: 2024-11-07

## 2025-04-12 PROCEDURE — 25000003 PHARM REV CODE 250: Performed by: OBSTETRICS & GYNECOLOGY

## 2025-04-12 PROCEDURE — 62326 NJX INTERLAMINAR LMBR/SAC: CPT | Performed by: NURSE ANESTHETIST, CERTIFIED REGISTERED

## 2025-04-12 PROCEDURE — 63600175 PHARM REV CODE 636 W HCPCS: Performed by: OBSTETRICS & GYNECOLOGY

## 2025-04-12 PROCEDURE — 59409 OBSTETRICAL CARE: CPT | Mod: ,,, | Performed by: NURSE ANESTHETIST, CERTIFIED REGISTERED

## 2025-04-12 PROCEDURE — 11000001 HC ACUTE MED/SURG PRIVATE ROOM

## 2025-04-12 PROCEDURE — 10907ZC DRAINAGE OF AMNIOTIC FLUID, THERAPEUTIC FROM PRODUCTS OF CONCEPTION, VIA NATURAL OR ARTIFICIAL OPENING: ICD-10-PCS | Performed by: OBSTETRICS & GYNECOLOGY

## 2025-04-12 PROCEDURE — 25000003 PHARM REV CODE 250: Performed by: NURSE ANESTHETIST, CERTIFIED REGISTERED

## 2025-04-12 RX ORDER — DEXMEDETOMIDINE HYDROCHLORIDE 100 UG/ML
5 INJECTION, SOLUTION INTRAVENOUS ONCE
Status: DISCONTINUED | OUTPATIENT
Start: 2025-04-12 | End: 2025-04-13

## 2025-04-12 RX ORDER — DEXMEDETOMIDINE HYDROCHLORIDE 100 UG/ML
INJECTION, SOLUTION INTRAVENOUS
Status: DISPENSED
Start: 2025-04-12 | End: 2025-04-13

## 2025-04-12 RX ORDER — LIDOCAINE HCL/EPINEPHRINE/PF 2%-1:200K
1 VIAL (ML) INJECTION ONCE
Status: COMPLETED | OUTPATIENT
Start: 2025-04-12 | End: 2025-04-13

## 2025-04-12 RX ORDER — LIDOCAINE HCL/EPINEPHRINE/PF 2%-1:200K
VIAL (ML) INJECTION
Status: COMPLETED
Start: 2025-04-12 | End: 2025-04-12

## 2025-04-12 RX ORDER — LIDOCAINE HYDROCHLORIDE AND EPINEPHRINE 15; 5 MG/ML; UG/ML
INJECTION, SOLUTION EPIDURAL
Status: DISCONTINUED | OUTPATIENT
Start: 2025-04-12 | End: 2025-04-13

## 2025-04-12 RX ORDER — ROPIVACAINE HYDROCHLORIDE 2 MG/ML
12 INJECTION, SOLUTION EPIDURAL; INFILTRATION CONTINUOUS
Status: DISCONTINUED | OUTPATIENT
Start: 2025-04-12 | End: 2025-04-13

## 2025-04-12 RX ORDER — ROPIVACAINE HYDROCHLORIDE 2 MG/ML
INJECTION, SOLUTION EPIDURAL; INFILTRATION; PERINEURAL
Status: COMPLETED
Start: 2025-04-12 | End: 2025-04-12

## 2025-04-12 RX ORDER — OXYTOCIN/0.9 % SODIUM CHLORIDE 15/250 ML
0-32 PLASTIC BAG, INJECTION (ML) INTRAVENOUS CONTINUOUS
Status: DISCONTINUED | OUTPATIENT
Start: 2025-04-13 | End: 2025-04-13

## 2025-04-12 RX ADMIN — LIDOCAINE HYDROCHLORIDE AND EPINEPHRINE 3 ML: 20; 5 INJECTION, SOLUTION EPIDURAL; INFILTRATION; INTRACAUDAL; PERINEURAL at 10:04

## 2025-04-12 RX ADMIN — MISOPROSTOL 25 MCG: 100 TABLET ORAL at 04:04

## 2025-04-12 RX ADMIN — ROPIVACAINE HYDROCHLORIDE 12 ML/HR: 2 INJECTION, SOLUTION EPIDURAL; INFILTRATION at 11:04

## 2025-04-12 RX ADMIN — MISOPROSTOL 25 MCG: 100 TABLET ORAL at 05:04

## 2025-04-12 RX ADMIN — DEXMEDETOMIDINE 3 MCG: 200 INJECTION, SOLUTION INTRAVENOUS at 11:04

## 2025-04-12 RX ADMIN — SODIUM CHLORIDE, POTASSIUM CHLORIDE, SODIUM LACTATE AND CALCIUM CHLORIDE 1000 ML: 600; 310; 30; 20 INJECTION, SOLUTION INTRAVENOUS at 09:04

## 2025-04-12 RX ADMIN — MISOPROSTOL 25 MCG: 100 TABLET ORAL at 09:04

## 2025-04-12 RX ADMIN — LIDOCAINE HYDROCHLORIDE AND EPINEPHRINE 3 ML: 15; 5 INJECTION, SOLUTION EPIDURAL at 10:04

## 2025-04-12 RX ADMIN — LIDOCAINE HYDROCHLORIDE AND EPINEPHRINE 2 ML: 15; 5 INJECTION, SOLUTION EPIDURAL at 10:04

## 2025-04-12 RX ADMIN — MISOPROSTOL 25 MCG: 100 TABLET ORAL at 01:04

## 2025-04-12 NOTE — H&P
[facility]  [facility]    Patient Name: Cait Virk  MRN: [id number]  Admission Date: 2025  Primary Care Provider: Tori, Primary Doctor    Subjective:     Principal Problem:Gestational hypertension, third trimester    History of Present Illness:  24 y.o. female  at 38w3d presented to the labor unit last night complaining of headache and severe range blood pressures in 160s over 100s to 1 teens at home.  Upon arrival to the labor unit she had a mild range pressure 140s systolic.  Serial blood pressures were performed and she did continued to have intermittent mild range elevated blood pressures.  HELLP labs were normal and urine protein was negative.  She has had elevated blood pressures in the past who was admitted for labor induction for new onset gestational hypertension.  Seen and examined this morning.  Headache has resolved.  Denies preeclampsia review of systems.  She is having mild pain from contractions but otherwise doing well.      Obstetric HPI:  This pregnancy has been complicated by morbid obesity with current BMI of 58, large for gestational age fetus, history of fetal hydronephrosis, maternal anxiety for which she takes BuSpar and Vistaril as needed, and iron-deficiency anemia of pregnancy for which she takes an iron supplement.  She has been on baby aspirin daily pre-e prophylaxis.  She was followed by Dr. Noriega and by Dr. Regine Bhatt, [facility], during this pregnancy.  She had good prenatal care.     [place]: 136  GBS: Negative    OB History    Para Term  AB Living   2 0 0 0 1 0   SAB IAB Ectopic Multiple Live Births   1 0 0 0 0      # Outcome Date GA Lbr Magdy/2nd Weight Sex Type Anes PTL Lv   2 Current            1 SAB 24 6w0d    SAB        Past Medical History:   Diagnosis Date    Anemia     Anxiety disorder, unspecified     Depression      Past Surgical History:   Procedure Laterality Date    WISDOM TOOTH EXTRACTION          PTA Medications   Medication Sig    acetaminophen (TYLENOL) 500 MG tablet Take 500 mg by mouth every 6 (six) hours as needed for Pain.    busPIRone (BUSPAR) 10 MG tablet Take 1 tablet (10 mg total) by mouth 2 (two) times daily.    aspirin (ECOTRIN) 81 MG EC tablet Take 1 tablet (81 mg total) by mouth once daily.    ferrous sulfate (FEOSOL) Tab tablet Take 1 tablet by mouth daily with breakfast.    hydrOXYzine pamoate (VISTARIL) 50 MG Cap Take 1 capsule (50 mg total) by mouth every 6 (six) hours as needed (anxiety).    prenatal no115/iron/folic acid (PRENATAL 19 ORAL) Take by mouth.       Review of patient's allergies indicates:  No Known Allergies     Family History       Problem Relation (Age of Onset)    Heart failure Maternal Grandfather, Paternal Uncle    Hypertension Father    Migraines Mother, Sister    Rashes / Skin problems Mother    Thyroid disease Mother          Tobacco Use    Smoking status: Never     Passive exposure: Never    Smokeless tobacco: Never   Substance and Sexual Activity    Alcohol use: Never    Drug use: Never    Sexual activity: Yes     Partners: Male     Birth control/protection: None     Comment: Only ever been with my current partner. Previous depo shot     Review of Systems   All other systems reviewed and are negative.     Objective:     Vital Signs (Most Recent):  Temp: 98.5 °F (36.9 °C) (04/12/25 0500)  Pulse: 87 (04/12/25 0719)  Resp: 18 (04/12/25 0500)  BP: 129/72 (04/12/25 0500)  SpO2: 97 % (04/12/25 0714) Vital Signs (24h Range):  Temp:  [98 °F (36.7 °C)-98.5 °F (36.9 °C)] 98.5 °F (36.9 °C)  Pulse:  [] 87  Resp:  [18-20] 18  SpO2:  [95 %-100 %] 97 %  BP: (124-141)/(70-87) 129/72        NST: 140, rx, cat 1  [place]: CTX q q2-5 min     Physical Exam:   Constitutional: She is oriented to person, place, and time.  Non-toxic appearance. She does not have a sickly appearance. She does not appear ill. No distress.    HENT:   Head: Normocephalic and atraumatic.    Eyes: Pupils  are equal, round, and reactive to light. Conjunctivae are normal.     Cardiovascular:       Exam reveals edema (1+ LE edema b/l). Exam reveals no clubbing and no cyanosis.        Pulmonary/Chest: Effort normal. No respiratory distress. She has no wheezes. She has no rales.        Abdominal: There is no abdominal tenderness. There is no rebound and no guarding.   Gravid     Genitourinary:    Vagina normal.             Musculoskeletal: Normal range of motion.       Neurological: She is alert and oriented to person, place, and time. She has normal reflexes.    Skin: Skin is warm and dry. No cyanosis. Nails show no clubbing.    Psychiatric: She has a normal mood and affect. Her behavior is normal. Judgment and thought content normal.        Cervix:  Dilation:  1  Effacement:  0%  Station: -3  Presentation: Vertex  Per recent nurse check at cytotec admin     Significant Labs:  Lab Results   Component Value Date    GROUPTRH A POS 04/11/2025    HEPBSAG Negative 03/27/2025       I have personallly reviewed all pertinent lab results from the last 24 hours.  Recent Lab Results         04/11/25 2119 04/11/25 2036        Albumin/Globulin Ratio 1.2         Albumin 3.1                  ALT 10         Anion Gap 4.0         Appearance, UA   Slightly Cloudy       AST 13         Bacteria, UA   Many       Baso # 0.02         Basophil % 0.2         Bilirubin (UA)   Negative       BILIRUBIN TOTAL 0.2         BUN 7         BUN/CREAT RATIO 15         Calcium 8.9         Chloride 111         CO2 19         Color, UA   Yellow       Creatinine 0.48         eGFR >90  Comment:                      EGFR INTERPRETATION    Beginning 8/15/22 we are reporting the eGFRcr calculation as recommended by the National Kid[employer] Foundation. The eGFRcr equation has similar overall performance characteristics to the older equation, but the values may differ by more than 10% particularly at higher values of eGFRcr and younger adult ages.    NKF  stages of chronic kidney disease (CKD)  Stage 1: Kidney damage with normal or increased eGFR (>90 mL/min/1.73 m^2)  Stage 2: Mild reduction in GFR (60-89 mL/min/1.73 m^2)  Stage 3a: Moderate reduction in GFR (45-59 mL/min/1.73 m^2)  Stage 3b: Moderate reduction in GFR (30-44 mL/min/1.73 m^2)  Stage 4: Severe reduction in GFR (15-29 mL/min/1.73 m^2)  Stage 5: Kidney failure (GFR <15 mL/min/1.73 m^2)      Estimated GFR calculated using the CKD-EPI creatinine () equation.         Eos # 0.11         Eos % 0.9         Globulin, Total 2.6         Glucose 101         Glucose, UA   Negative       Group & Rh A POS         Hematocrit 30.7         Hemoglobin 9.5         Immature Grans (Abs) 0.07         Immature Granulocytes 0.6         Indirect Lloyd GEL NEG         Ketones, UA   Negative       Lactate Dehydrogenase 202         Leukocyte Esterase, UA   Moderate       Lymph # 1.70         LYMPH % 14.4         MCH 24.4         MCHC 30.9         MCV 78.7         Mono # 0.64         Mono % 5.4         MPV 10.6         Mucous, UA   Small       Neut # 9.28         Neut % 78.5         NITRITE UA   Negative       nRBC 0.0         Blood, UA   Negative       pH, UA   6.0       Platelet Count 252         Potassium 3.9         PROTEIN TOTAL 5.7         Protein, UA   Negative       RBC 3.90         RBC, UA   0-5       RDW 19.2         Sodium 134         Specific Gravity,UA   1.020       Specimen Outdate 2025 23:59         Squam Epithel, UA   Many       Uric Acid 3.6         Urobilinogen, UA   0.2       WBC, UA   21-50       WBC 11.82               Assessment/Plan:     24 y.o. female  at 38w3d for:  Active Hospital Problems    Diagnosis  POA    *Gestational hypertension, third trimester [O13.3]  Yes    38 weeks gestation of pregnancy [Z3A.38]  Not Applicable    Iron deficiency anemia during pregnancy [O99.019, D50.9]  Yes    - Large for gestational age fetus affecting mother, antepartum, third trimester, single gestation  [O36.63X0]  Yes    -Anxiety affecting pregnancy in third trimester [O99.343]  Yes    - Fetal hydronephrosis during pregnancy, antepartum, other fetus [O35.EXX9]  Yes    Morbid obesity with BMI of 50.0-59.9, adult [E66.01, Z68.43]  Not Applicable      Resolved Hospital Problems   No resolved problems to display.       Admit for labor induction for GHTN  Cytotec for cervical ripening  Continuous maternal and fetal monitoring  Control severe range pressures with IV meds as needed  Epidural when ready  AROM if/when appropriate  Pitocin per protocol as needed  IV fluids  Anticipate vaginal delivery    No notes have been filed under this hospital service.  Service: Obstetrics and Gynecology      LILY PINTO MD  Obstetrics  [facility]

## 2025-04-12 NOTE — SUBJECTIVE & OBJECTIVE
Obstetric HPI:  This pregnancy has been complicated by morbid obesity with current BMI of 58, large for gestational age fetus, history of fetal hydronephrosis, maternal anxiety for which she takes BuSpar and Vistaril as needed, and iron-deficiency anemia of pregnancy for which she takes an iron supplement.  She has been on baby aspirin daily pre-e prophylaxis.  She was followed by Dr. Noriega and by Dr. Regine Bhatt [facility], during this pregnancy.  She had good prenatal care.     O'rizo: 136  GBS: Negative    OB History    Para Term  AB Living   2 0 0 0 1 0   SAB IAB Ectopic Multiple Live Births   1 0 0 0 0      # Outcome Date GA Lbr Magdy/2nd Weight Sex Type Anes PTL Lv   2 Current            1 SAB 24 6w0d    SAB        Past Medical History:   Diagnosis Date    Anemia     Anxiety disorder, unspecified     Depression      Past Surgical History:   Procedure Laterality Date    WISDOM TOOTH EXTRACTION         PTA Medications   Medication Sig    acetaminophen (TYLENOL) 500 MG tablet Take 500 mg by mouth every 6 (six) hours as needed for Pain.    busPIRone (BUSPAR) 10 MG tablet Take 1 tablet (10 mg total) by mouth 2 (two) times daily.    aspirin (ECOTRIN) 81 MG EC tablet Take 1 tablet (81 mg total) by mouth once daily.    ferrous sulfate (FEOSOL) Tab tablet Take 1 tablet by mouth daily with breakfast.    hydrOXYzine pamoate (VISTARIL) 50 MG Cap Take 1 capsule (50 mg total) by mouth every 6 (six) hours as needed (anxiety).    prenatal no115/iron/folic acid (PRENATAL 19 ORAL) Take by mouth.       Review of patient's allergies indicates:  No Known Allergies     Family History       Problem Relation (Age of Onset)    Heart failure Maternal Grandfather, Paternal Uncle    Hypertension Father    Migraines Mother, Sister    Rashes / Skin problems Mother    Thyroid disease Mother          Tobacco Use    Smoking status: Never     Passive exposure: Never    Smokeless tobacco: Never   Substance and Sexual  Activity    Alcohol use: Never    Drug use: Never    Sexual activity: Yes     Partners: Male     Birth control/protection: None     Comment: Only ever been with my current partner. Previous depo shot     Review of Systems   All other systems reviewed and are negative.     Objective:     Vital Signs (Most Recent):  Temp: 98.5 °F (36.9 °C) (04/12/25 0500)  Pulse: 87 (04/12/25 0719)  Resp: 18 (04/12/25 0500)  BP: 129/72 (04/12/25 0500)  SpO2: 97 % (04/12/25 0714) Vital Signs (24h Range):  Temp:  [98 °F (36.7 °C)-98.5 °F (36.9 °C)] 98.5 °F (36.9 °C)  Pulse:  [] 87  Resp:  [18-20] 18  SpO2:  [95 %-100 %] 97 %  BP: (124-141)/(70-87) 129/72        NST: 140, rx, cat 1  [place]: CTX q q2-5 min     Physical Exam:   Constitutional: She is oriented to person, place, and time.  Non-toxic appearance. She does not have a sickly appearance. She does not appear ill. No distress.    HENT:   Head: Normocephalic and atraumatic.    Eyes: Pupils are equal, round, and reactive to light. Conjunctivae are normal.     Cardiovascular:       Exam reveals edema (1+ LE edema b/l). Exam reveals no clubbing and no cyanosis.        Pulmonary/Chest: Effort normal. No respiratory distress. She has no wheezes. She has no rales.        Abdominal: There is no abdominal tenderness. There is no rebound and no guarding.   Gravid     Genitourinary:    Vagina normal.             Musculoskeletal: Normal range of motion.       Neurological: She is alert and oriented to person, place, and time. She has normal reflexes.    Skin: Skin is warm and dry. No cyanosis. Nails show no clubbing.    Psychiatric: She has a normal mood and affect. Her behavior is normal. Judgment and thought content normal.        Cervix:  Dilation:  1  Effacement:  0%  Station: -3  Presentation: Vertex  Per recent nurse check at cytotec admin     Significant Labs:  Lab Results   Component Value Date    GROUPTRH A POS 04/11/2025    HEPBSAG Negative 03/27/2025       I have personallly  reviewed all pertinent lab results from the last 24 hours.  Recent Lab Results         04/11/25 2119 04/11/25 2036        Albumin/Globulin Ratio 1.2         Albumin 3.1                  ALT 10         Anion Gap 4.0         Appearance, UA   Slightly Cloudy       AST 13         Bacteria, UA   Many       Baso # 0.02         Basophil % 0.2         Bilirubin (UA)   Negative       BILIRUBIN TOTAL 0.2         BUN 7         BUN/CREAT RATIO 15         Calcium 8.9         Chloride 111         CO2 19         Color, UA   Yellow       Creatinine 0.48         eGFR >90  Comment:                      EGFR INTERPRETATION    Beginning 8/15/22 we are reporting the eGFRcr calculation as recommended by the National Kidney Foundation. The eGFRcr equation has similar overall performance characteristics to the older equation, but the values may differ by more than 10% particularly at higher values of eGFRcr and younger adult ages.    NKF stages of chronic kidney disease (CKD)  Stage 1: Kidney damage with normal or increased eGFR (>90 mL/min/1.73 m^2)  Stage 2: Mild reduction in GFR (60-89 mL/min/1.73 m^2)  Stage 3a: Moderate reduction in GFR (45-59 mL/min/1.73 m^2)  Stage 3b: Moderate reduction in GFR (30-44 mL/min/1.73 m^2)  Stage 4: Severe reduction in GFR (15-29 mL/min/1.73 m^2)  Stage 5: Kidney failure (GFR <15 mL/min/1.73 m^2)      Estimated GFR calculated using the CKD-EPI creatinine (2021) equation.         Eos # 0.11         Eos % 0.9         Globulin, Total 2.6         Glucose 101         Glucose, UA   Negative       Group & Rh A POS         Hematocrit 30.7         Hemoglobin 9.5         Immature Grans (Abs) 0.07         Immature Granulocytes 0.6         Indirect Lloyd GEL NEG         Ketones, UA   Negative       Lactate Dehydrogenase 202         Leukocyte Esterase, UA   Moderate       Lymph # 1.70         LYMPH % 14.4         MCH 24.4         MCHC 30.9         MCV 78.7         Mono # 0.64         Mono % 5.4          MPV 10.6         Mucous, UA   Small       Neut # 9.28         Neut % 78.5         NITRITE UA   Negative       nRBC 0.0         Blood, UA   Negative       pH, UA   6.0       Platelet Count 252         Potassium 3.9         PROTEIN TOTAL 5.7         Protein, UA   Negative       RBC 3.90         RBC, UA   0-5       RDW 19.2         Sodium 134         Specific Gravity,UA   1.020       Specimen Outdate 04/14/2025 23:59         Squam Epithel, UA   Many       Uric Acid 3.6         Urobilinogen, UA   0.2       WBC, UA   21-50       WBC 11.82

## 2025-04-12 NOTE — NURSING
1920- Pt presented to OB unit and placed in exam room 129. Pt c/o elevated blood pressure at home and headache today. Pt denies vaginal bleeding, LOF, and reports positive fetal movement. EFM applied, serial Bps initiated, unable to collect UA at this time pt educated on need for sample. Pt educated on POC and verbalizes understanding.     2020- Irregular contractions noted. SVE performed 1/0/-3.   UA collected.    2100- Dr. Samayoa updated on pt complaint, serial Bps, late deceleration and current EFM tracing with irregular contractions. Orders received for pre-eclampsia labs and 1 more hour of serial bps. MD will call back to decide whether to admit pt. VORB. Pt updated on POC and verbalizes understanding.    2205- Dr. Samayoa phoned unit. Orders received to admit pt to observation with BP Q3H, IV fluids, and continuous fetal monitoring. Pt may have vistaril PRN anxiety. If /90, induce pt with cytotec. VORB. Pt educated on POC and verbalizes understanding.     2230- /74, notified MD, will start cytotec induction.

## 2025-04-12 NOTE — HPI
24 y.o. female  at 38w3d presented to the labor unit last night complaining of headache and severe range blood pressures in 160s over 100s to 1 teens at home.  Upon arrival to the labor unit she had a mild range pressure 140s systolic.  Serial blood pressures were performed and she did continued to have intermittent mild range elevated blood pressures.  HELLP labs were normal and urine protein was negative.  She has had elevated blood pressures in the past who was admitted for labor induction for new onset gestational hypertension.  Seen and examined this morning.  Headache has resolved.  Denies preeclampsia review of systems.  She is having mild pain from contractions but otherwise doing well.

## 2025-04-13 PROBLEM — Z36.89 ENCOUNTER FOR TRIAGE IN PREGNANT PATIENT: Status: ACTIVE | Noted: 2025-04-13

## 2025-04-13 PROBLEM — Z98.891 STATUS POST CESAREAN SECTION: Status: ACTIVE | Noted: 2025-04-13

## 2025-04-13 LAB
ALBUMIN SERPL-MCNC: 2.9 G/DL (ref 3.4–5)
ALBUMIN/GLOB SERPL: 1.2 RATIO
ALP SERPL-CCNC: 190 UNIT/L (ref 50–144)
ALT SERPL-CCNC: 16 UNIT/L (ref 1–45)
ANION GAP SERPL CALC-SCNC: 7 MEQ/L (ref 2–13)
AST SERPL-CCNC: 28 UNIT/L (ref 14–36)
BILIRUB SERPL-MCNC: 0.5 MG/DL (ref 0–1)
BUN SERPL-MCNC: 8 MG/DL (ref 7–20)
CALCIUM SERPL-MCNC: 8.9 MG/DL (ref 8.4–10.2)
CHLORIDE SERPL-SCNC: 113 MMOL/L (ref 98–110)
CO2 SERPL-SCNC: 16 MMOL/L (ref 21–32)
CREAT SERPL-MCNC: 0.71 MG/DL (ref 0.66–1.25)
CREAT/UREA NIT SERPL: 11 (ref 12–20)
GFR SERPLBLD CREATININE-BSD FMLA CKD-EPI: >90 ML/MIN/1.73/M2
GLOBULIN SER-MCNC: 2.5 GM/DL (ref 2–3.9)
GLUCOSE SERPL-MCNC: 119 MG/DL (ref 70–115)
HCT VFR BLD AUTO: 28.4 % (ref 36–48)
HGB BLD-MCNC: 8.5 G/DL (ref 11.8–16)
POTASSIUM SERPL-SCNC: 4.3 MMOL/L (ref 3.5–5.1)
PROT SERPL-MCNC: 5.4 GM/DL (ref 6.3–8.2)
SODIUM SERPL-SCNC: 136 MMOL/L (ref 136–145)

## 2025-04-13 PROCEDURE — 25000003 PHARM REV CODE 250: Performed by: OBSTETRICS & GYNECOLOGY

## 2025-04-13 PROCEDURE — 80053 COMPREHEN METABOLIC PANEL: CPT | Performed by: OBSTETRICS & GYNECOLOGY

## 2025-04-13 PROCEDURE — 36415 COLL VENOUS BLD VENIPUNCTURE: CPT | Performed by: OBSTETRICS & GYNECOLOGY

## 2025-04-13 PROCEDURE — 11000001 HC ACUTE MED/SURG PRIVATE ROOM

## 2025-04-13 PROCEDURE — 27201423 OPTIME MED/SURG SUP & DEVICES STERILE SUPPLY: Performed by: OBSTETRICS & GYNECOLOGY

## 2025-04-13 PROCEDURE — 63600175 PHARM REV CODE 636 W HCPCS: Performed by: OBSTETRICS & GYNECOLOGY

## 2025-04-13 PROCEDURE — 25000003 PHARM REV CODE 250

## 2025-04-13 PROCEDURE — 37000009 HC ANESTHESIA EA ADD 15 MINS: Performed by: OBSTETRICS & GYNECOLOGY

## 2025-04-13 PROCEDURE — 63600175 PHARM REV CODE 636 W HCPCS: Performed by: NURSE ANESTHETIST, CERTIFIED REGISTERED

## 2025-04-13 PROCEDURE — 85014 HEMATOCRIT: CPT | Performed by: OBSTETRICS & GYNECOLOGY

## 2025-04-13 PROCEDURE — 36000709 HC OR TIME LEV III EA ADD 15 MIN: Performed by: OBSTETRICS & GYNECOLOGY

## 2025-04-13 PROCEDURE — 01968 ANES/ANALG CS DLVR NEURAXIAL: CPT | Mod: ,,, | Performed by: NURSE ANESTHETIST, CERTIFIED REGISTERED

## 2025-04-13 PROCEDURE — 37000008 HC ANESTHESIA 1ST 15 MINUTES: Performed by: OBSTETRICS & GYNECOLOGY

## 2025-04-13 PROCEDURE — 36000708 HC OR TIME LEV III 1ST 15 MIN: Performed by: OBSTETRICS & GYNECOLOGY

## 2025-04-13 PROCEDURE — 71000033 HC RECOVERY, INTIAL HOUR: Performed by: OBSTETRICS & GYNECOLOGY

## 2025-04-13 RX ORDER — PRENATAL WITH FERROUS FUM AND FOLIC ACID 3080; 920; 120; 400; 22; 1.84; 3; 20; 10; 1; 12; 200; 27; 25; 2 [IU]/1; [IU]/1; MG/1; [IU]/1; MG/1; MG/1; MG/1; MG/1; MG/1; MG/1; UG/1; MG/1; MG/1; MG/1; MG/1
1 TABLET ORAL DAILY
Status: DISCONTINUED | OUTPATIENT
Start: 2025-04-14 | End: 2025-04-13 | Stop reason: SDUPTHER

## 2025-04-13 RX ORDER — CARBOPROST TROMETHAMINE 250 UG/ML
250 INJECTION, SOLUTION INTRAMUSCULAR
Status: DISCONTINUED | OUTPATIENT
Start: 2025-04-13 | End: 2025-04-14

## 2025-04-13 RX ORDER — SIMETHICONE 80 MG
1 TABLET,CHEWABLE ORAL EVERY 6 HOURS PRN
Status: DISCONTINUED | OUTPATIENT
Start: 2025-04-13 | End: 2025-04-15 | Stop reason: HOSPADM

## 2025-04-13 RX ORDER — OXYTOCIN 10 [USP'U]/ML
10 INJECTION, SOLUTION INTRAMUSCULAR; INTRAVENOUS ONCE AS NEEDED
Status: DISCONTINUED | OUTPATIENT
Start: 2025-04-13 | End: 2025-04-14

## 2025-04-13 RX ORDER — OXYTOCIN-SODIUM CHLORIDE 0.9% IV SOLN 30 UNIT/500ML 30-0.9/5 UT/ML-%
30 SOLUTION INTRAVENOUS ONCE AS NEEDED
Status: DISCONTINUED | OUTPATIENT
Start: 2025-04-13 | End: 2025-04-14

## 2025-04-13 RX ORDER — HYDROXYZINE PAMOATE 50 MG/1
50 CAPSULE ORAL EVERY 6 HOURS PRN
Status: DISCONTINUED | OUTPATIENT
Start: 2025-04-13 | End: 2025-04-15 | Stop reason: HOSPADM

## 2025-04-13 RX ORDER — DOCUSATE SODIUM 100 MG/1
200 CAPSULE, LIQUID FILLED ORAL 2 TIMES DAILY
Status: DISCONTINUED | OUTPATIENT
Start: 2025-04-13 | End: 2025-04-15 | Stop reason: HOSPADM

## 2025-04-13 RX ORDER — KETOROLAC TROMETHAMINE 30 MG/ML
30 INJECTION, SOLUTION INTRAMUSCULAR; INTRAVENOUS EVERY 8 HOURS
Status: COMPLETED | OUTPATIENT
Start: 2025-04-13 | End: 2025-04-14

## 2025-04-13 RX ORDER — ADHESIVE BANDAGE
30 BANDAGE TOPICAL 2 TIMES DAILY PRN
Status: DISCONTINUED | OUTPATIENT
Start: 2025-04-14 | End: 2025-04-15 | Stop reason: HOSPADM

## 2025-04-13 RX ORDER — CEFAZOLIN 2 G/1
2 INJECTION, POWDER, FOR SOLUTION INTRAMUSCULAR; INTRAVENOUS ONCE AS NEEDED
Status: DISCONTINUED | OUTPATIENT
Start: 2025-04-13 | End: 2025-04-15 | Stop reason: HOSPADM

## 2025-04-13 RX ORDER — MISOPROSTOL 100 UG/1
800 TABLET ORAL ONCE AS NEEDED
Status: DISCONTINUED | OUTPATIENT
Start: 2025-04-13 | End: 2025-04-14

## 2025-04-13 RX ORDER — CEFAZOLIN SODIUM 1 G/3ML
INJECTION, POWDER, FOR SOLUTION INTRAMUSCULAR; INTRAVENOUS
Status: DISCONTINUED | OUTPATIENT
Start: 2025-04-13 | End: 2025-04-13 | Stop reason: HOSPADM

## 2025-04-13 RX ORDER — BISACODYL 10 MG/1
10 SUPPOSITORY RECTAL ONCE AS NEEDED
Status: DISCONTINUED | OUTPATIENT
Start: 2025-04-13 | End: 2025-04-15 | Stop reason: HOSPADM

## 2025-04-13 RX ORDER — HYDROMORPHONE HYDROCHLORIDE 1 MG/ML
1 INJECTION, SOLUTION INTRAMUSCULAR; INTRAVENOUS; SUBCUTANEOUS EVERY 4 HOURS PRN
Status: DISCONTINUED | OUTPATIENT
Start: 2025-04-13 | End: 2025-04-15 | Stop reason: HOSPADM

## 2025-04-13 RX ORDER — LIDOCAINE HCL/EPINEPHRINE/PF 2%-1:200K
VIAL (ML) INJECTION
Status: DISCONTINUED | OUTPATIENT
Start: 2025-04-13 | End: 2025-04-13

## 2025-04-13 RX ORDER — DIPHENHYDRAMINE HYDROCHLORIDE 50 MG/ML
25 INJECTION, SOLUTION INTRAMUSCULAR; INTRAVENOUS ONCE
Status: COMPLETED | OUTPATIENT
Start: 2025-04-13 | End: 2025-04-13

## 2025-04-13 RX ORDER — BUSPIRONE HYDROCHLORIDE 5 MG/1
10 TABLET ORAL 2 TIMES DAILY
Status: DISCONTINUED | OUTPATIENT
Start: 2025-04-13 | End: 2025-04-15 | Stop reason: HOSPADM

## 2025-04-13 RX ORDER — SODIUM CHLORIDE 0.9 % (FLUSH) 0.9 %
10 SYRINGE (ML) INJECTION
Status: DISCONTINUED | OUTPATIENT
Start: 2025-04-13 | End: 2025-04-15 | Stop reason: HOSPADM

## 2025-04-13 RX ORDER — DIPHENOXYLATE HYDROCHLORIDE AND ATROPINE SULFATE 2.5; .025 MG/1; MG/1
2 TABLET ORAL EVERY 6 HOURS PRN
Status: DISCONTINUED | OUTPATIENT
Start: 2025-04-13 | End: 2025-04-13 | Stop reason: SDUPTHER

## 2025-04-13 RX ORDER — ASPIRIN 81 MG/1
81 TABLET ORAL DAILY
Status: DISCONTINUED | OUTPATIENT
Start: 2025-04-14 | End: 2025-04-15 | Stop reason: HOSPADM

## 2025-04-13 RX ORDER — OXYCODONE AND ACETAMINOPHEN 10; 325 MG/1; MG/1
1 TABLET ORAL EVERY 4 HOURS PRN
Status: DISCONTINUED | OUTPATIENT
Start: 2025-04-13 | End: 2025-04-15 | Stop reason: HOSPADM

## 2025-04-13 RX ORDER — TRANEXAMIC ACID 10 MG/ML
1000 INJECTION, SOLUTION INTRAVENOUS EVERY 30 MIN PRN
Status: DISCONTINUED | OUTPATIENT
Start: 2025-04-13 | End: 2025-04-14

## 2025-04-13 RX ORDER — IBUPROFEN 600 MG/1
600 TABLET ORAL EVERY 6 HOURS PRN
Status: DISCONTINUED | OUTPATIENT
Start: 2025-04-14 | End: 2025-04-14

## 2025-04-13 RX ORDER — METHYLERGONOVINE MALEATE 0.2 MG/ML
200 INJECTION INTRAVENOUS ONCE AS NEEDED
Status: DISCONTINUED | OUTPATIENT
Start: 2025-04-13 | End: 2025-04-14

## 2025-04-13 RX ORDER — AMOXICILLIN 250 MG
1 CAPSULE ORAL NIGHTLY PRN
Status: DISCONTINUED | OUTPATIENT
Start: 2025-04-13 | End: 2025-04-15 | Stop reason: HOSPADM

## 2025-04-13 RX ORDER — ONDANSETRON HYDROCHLORIDE 2 MG/ML
INJECTION, SOLUTION INTRAVENOUS
Status: DISCONTINUED | OUTPATIENT
Start: 2025-04-13 | End: 2025-04-13

## 2025-04-13 RX ORDER — LANOLIN ALCOHOL/MO/W.PET/CERES
1 CREAM (GRAM) TOPICAL
Status: DISCONTINUED | OUTPATIENT
Start: 2025-04-14 | End: 2025-04-15 | Stop reason: HOSPADM

## 2025-04-13 RX ORDER — ONDANSETRON 4 MG/1
8 TABLET, ORALLY DISINTEGRATING ORAL EVERY 8 HOURS PRN
Status: DISCONTINUED | OUTPATIENT
Start: 2025-04-13 | End: 2025-04-15 | Stop reason: HOSPADM

## 2025-04-13 RX ORDER — PENICILLIN G 3000000 [IU]/50ML
3 INJECTION, SOLUTION INTRAVENOUS
Status: DISCONTINUED | OUTPATIENT
Start: 2025-04-13 | End: 2025-04-14

## 2025-04-13 RX ORDER — PRENATAL WITH FERROUS FUM AND FOLIC ACID 3080; 920; 120; 400; 22; 1.84; 3; 20; 10; 1; 12; 200; 27; 25; 2 [IU]/1; [IU]/1; MG/1; [IU]/1; MG/1; MG/1; MG/1; MG/1; MG/1; MG/1; UG/1; MG/1; MG/1; MG/1; MG/1
1 TABLET ORAL DAILY
Status: DISCONTINUED | OUTPATIENT
Start: 2025-04-14 | End: 2025-04-15 | Stop reason: HOSPADM

## 2025-04-13 RX ORDER — MIDAZOLAM HYDROCHLORIDE 1 MG/ML
INJECTION INTRAMUSCULAR; INTRAVENOUS
Status: DISCONTINUED | OUTPATIENT
Start: 2025-04-13 | End: 2025-04-13

## 2025-04-13 RX ORDER — LIDOCAINE HCL/EPINEPHRINE/PF 2%-1:200K
VIAL (ML) INJECTION
Status: COMPLETED
Start: 2025-04-13 | End: 2025-04-13

## 2025-04-13 RX ORDER — DIPHENHYDRAMINE HCL 25 MG
25 CAPSULE ORAL EVERY 4 HOURS PRN
Status: DISCONTINUED | OUTPATIENT
Start: 2025-04-13 | End: 2025-04-15 | Stop reason: HOSPADM

## 2025-04-13 RX ORDER — MUPIROCIN 20 MG/G
OINTMENT TOPICAL 2 TIMES DAILY
Status: DISCONTINUED | OUTPATIENT
Start: 2025-04-13 | End: 2025-04-15 | Stop reason: HOSPADM

## 2025-04-13 RX ORDER — HYDROCORTISONE 25 MG/G
CREAM TOPICAL 3 TIMES DAILY PRN
Status: DISCONTINUED | OUTPATIENT
Start: 2025-04-13 | End: 2025-04-15 | Stop reason: HOSPADM

## 2025-04-13 RX ORDER — ENOXAPARIN SODIUM 100 MG/ML
60 INJECTION SUBCUTANEOUS EVERY 12 HOURS
Status: DISCONTINUED | OUTPATIENT
Start: 2025-04-14 | End: 2025-04-15 | Stop reason: HOSPADM

## 2025-04-13 RX ORDER — MISOPROSTOL 200 UG/1
TABLET ORAL
Status: DISCONTINUED | OUTPATIENT
Start: 2025-04-13 | End: 2025-04-13 | Stop reason: HOSPADM

## 2025-04-13 RX ORDER — HYDROCODONE BITARTRATE AND ACETAMINOPHEN 7.5; 325 MG/1; MG/1
1 TABLET ORAL EVERY 4 HOURS PRN
Status: DISCONTINUED | OUTPATIENT
Start: 2025-04-13 | End: 2025-04-15 | Stop reason: HOSPADM

## 2025-04-13 RX ORDER — OXYTOCIN/0.9 % SODIUM CHLORIDE 15/250 ML
95 PLASTIC BAG, INJECTION (ML) INTRAVENOUS ONCE AS NEEDED
Status: DISCONTINUED | OUTPATIENT
Start: 2025-04-13 | End: 2025-04-14

## 2025-04-13 RX ORDER — MISOPROSTOL 100 UG/1
1000 TABLET ORAL ONCE AS NEEDED
Status: DISCONTINUED | OUTPATIENT
Start: 2025-04-13 | End: 2025-04-14

## 2025-04-13 RX ORDER — OXYTOCIN/0.9 % SODIUM CHLORIDE 15/250 ML
95 PLASTIC BAG, INJECTION (ML) INTRAVENOUS CONTINUOUS PRN
Status: DISCONTINUED | OUTPATIENT
Start: 2025-04-13 | End: 2025-04-14

## 2025-04-13 RX ORDER — FENTANYL CITRATE 50 UG/ML
INJECTION, SOLUTION INTRAMUSCULAR; INTRAVENOUS
Status: DISCONTINUED | OUTPATIENT
Start: 2025-04-13 | End: 2025-04-13

## 2025-04-13 RX ORDER — DIPHENOXYLATE HYDROCHLORIDE AND ATROPINE SULFATE 2.5; .025 MG/1; MG/1
2 TABLET ORAL EVERY 6 HOURS PRN
Status: DISCONTINUED | OUTPATIENT
Start: 2025-04-13 | End: 2025-04-15 | Stop reason: HOSPADM

## 2025-04-13 RX ORDER — OXYTOCIN 10 [USP'U]/ML
INJECTION, SOLUTION INTRAMUSCULAR; INTRAVENOUS
Status: DISCONTINUED | OUTPATIENT
Start: 2025-04-13 | End: 2025-04-13 | Stop reason: HOSPADM

## 2025-04-13 RX ADMIN — LIDOCAINE HYDROCHLORIDE AND EPINEPHRINE 3 ML: 20; 5 INJECTION, SOLUTION EPIDURAL; INFILTRATION; INTRACAUDAL; PERINEURAL at 07:04

## 2025-04-13 RX ADMIN — MUPIROCIN: 20 OINTMENT TOPICAL at 08:04

## 2025-04-13 RX ADMIN — LIDOCAINE HYDROCHLORIDE AND EPINEPHRINE 5 ML: 20; 5 INJECTION, SOLUTION EPIDURAL; INFILTRATION; INTRACAUDAL; PERINEURAL at 03:04

## 2025-04-13 RX ADMIN — ONDANSETRON 4 MG: 2 INJECTION INTRAMUSCULAR; INTRAVENOUS at 04:04

## 2025-04-13 RX ADMIN — HYDROMORPHONE HYDROCHLORIDE 1 MG: 1 INJECTION, SOLUTION INTRAMUSCULAR; INTRAVENOUS; SUBCUTANEOUS at 05:04

## 2025-04-13 RX ADMIN — DOCUSATE SODIUM 200 MG: 100 CAPSULE, LIQUID FILLED ORAL at 08:04

## 2025-04-13 RX ADMIN — HYDROCODONE BITARTRATE AND ACETAMINOPHEN 1 TABLET: 7.5; 325 TABLET ORAL at 08:04

## 2025-04-13 RX ADMIN — DEXTROSE MONOHYDRATE 3 G: 5 INJECTION INTRAVENOUS at 03:04

## 2025-04-13 RX ADMIN — FENTANYL CITRATE 50 MCG: 50 INJECTION, SOLUTION INTRAMUSCULAR; INTRAVENOUS at 03:04

## 2025-04-13 RX ADMIN — BUSPIRONE HYDROCHLORIDE 10 MG: 5 TABLET ORAL at 08:04

## 2025-04-13 RX ADMIN — OXYTOCIN 4 MILLI-UNITS/MIN: 10 INJECTION, SOLUTION INTRAMUSCULAR; INTRAVENOUS at 12:04

## 2025-04-13 RX ADMIN — DIPHENHYDRAMINE HYDROCHLORIDE 25 MG: 50 INJECTION INTRAMUSCULAR; INTRAVENOUS at 02:04

## 2025-04-13 RX ADMIN — MIDAZOLAM HYDROCHLORIDE 1 MG: 1 INJECTION, SOLUTION INTRAMUSCULAR; INTRAVENOUS at 04:04

## 2025-04-13 RX ADMIN — AZITHROMYCIN MONOHYDRATE 500 MG: 500 INJECTION, POWDER, LYOPHILIZED, FOR SOLUTION INTRAVENOUS at 02:04

## 2025-04-13 RX ADMIN — MIDAZOLAM HYDROCHLORIDE 1 MG: 1 INJECTION, SOLUTION INTRAMUSCULAR; INTRAVENOUS at 03:04

## 2025-04-13 RX ADMIN — KETOROLAC TROMETHAMINE 30 MG: 30 INJECTION, SOLUTION INTRAMUSCULAR at 10:04

## 2025-04-13 NOTE — ANESTHESIA PREPROCEDURE EVALUATION
04/12/2025  Cait Virk is a 24 y.o., female.  Anesthesia History     Depression Anxiety disorder, unspecified   Anemia      Surgical History     WISDOM TOOTH EXTRACTION      Substance History     Smoking Status: Never   Passive Exposure: Never   Smokeless Tobacco Status: Never   Alcohol use: Never   Drug use: Never     Problem List   Current as of 04/12/25 2229  - Fetal hydronephrosis during pregnancy, antepartum, other fetus   - Large for gestational age fetus affecting mother, antepartum, third trimester, single gestation   -Anxiety affecting pregnancy in third trimester   38 weeks gestation of pregnancy   Gestational hypertension, third trimester   Iron deficiency anemia during pregnancy   Maternal obesity syndrome in third trimester   Morbid obesity with BMI of 50.0-59.9, adult       Pre-op Assessment    I have reviewed the Patient Summary Reports.     I have reviewed the Nursing Notes. I have reviewed the NPO Status.   I have reviewed the Medications.     Review of Systems  Anesthesia Hx:  No problems with previous Anesthesia             Denies Family Hx of Anesthesia complications.    Denies Personal Hx of Anesthesia complications.                    Social:  Non-Smoker       Hematology/Oncology:    Oncology Normal    -- Anemia:                                  EENT/Dental:  EENT/Dental Normal           Cardiovascular:  Exercise tolerance: good   Hypertension                                          Pulmonary:  Pulmonary Normal                       Renal/:  Chronic Renal Disease                Hepatic/GI:     GERD                Musculoskeletal:  Musculoskeletal Normal                Neurological:  Neurology Normal                                      Endocrine:  Endocrine Normal          Morbid Obesity / BMI > 40  Dermatological:  Skin Normal    Psych:  Psychiatric History anxiety depression                 Physical Exam  General: Well nourished, Cooperative, Alert and Oriented    Airway:  Mallampati: III / III  Mouth Opening: Normal  TM Distance: Normal  Tongue: Large  Neck ROM: Normal ROM    Dental:  Intact        Anesthesia Plan  Type of Anesthesia, risks & benefits discussed:    Anesthesia Type: CSE  Intra-op Monitoring Plan: Standard ASA Monitors  Post Op Pain Control Plan: multimodal analgesia  Induction:  IV  Airway Plan: Direct  Informed Consent: Informed consent signed with the Patient and all parties understand the risks and agree with anesthesia plan.  All questions answered. Patient consented to blood products? Yes  ASA Score: 3  Day of Surgery Review of History & Physical: H&P Update referred to the surgeon/provider.I have interviewed and examined the patient. I have reviewed the patient's H&P dated: There are no significant changes.     Ready For Surgery From Anesthesia Perspective.     .

## 2025-04-13 NOTE — PLAN OF CARE
Patient transferred to OB room in stable condition and resting comfortably. Dynamap monitors applied, SCD machine remained applied, call bell in reach, RN and family at bedside, RN verbalized understanding of report.

## 2025-04-13 NOTE — PROGRESS NOTES
[facility]  Obstetrics  Labor Progress Note    Patient Name: Cait Virk  MRN: [id number]  Admission Date: 2025  Hospital Length of Stay: 2 days  Attending Physician: Dennis Samayoa MD  Primary Care Provider: Tori, Primary Doctor    Subjective:     Principal Problem:Gestational hypertension, third trimester    Interval History:  Cait is a 24 y.o.  at 38w4d. She is doing well. She has received her epidural.  There has been some difficulty monitoring contractions and fetal heart rate when she is on her side.    Objective:     Vital Signs (Most Recent):  Temp: 98.6 °F (37 °C) (25 0850)  Pulse: 94 (2501)  Resp: 18 (25)  BP: 128/68 (2501)  SpO2: 100 % (25 0033) Vital Signs (24h Range):  Temp:  [98.3 °F (36.8 °C)-98.6 °F (37 °C)] 98.6 °F (37 °C)  Pulse:  [] 94  Resp:  [18] 18  SpO2:  [97 %-100 %] 100 %  BP: (108-152)/(51-95) 128/68     Weight: (!) 145.2 kg (320 lb 1.7 oz)  Body mass index is 58.55 kg/m².    FHT: 150 Cat 1 (reassuring)  TOCO:  Q 2 minutes    Physical Exam:   Constitutional: She is oriented to person, place, and time. She appears well-developed. No distress.       Cardiovascular:  Normal rate.      Exam reveals edema (1+ B/l LE edema).        Pulmonary/Chest: Effort normal. No respiratory distress. She has no wheezes.        Abdominal: There is no abdominal tenderness. There is no rebound and no guarding.     Genitourinary:    Vagina normal.                 Neurological: She is alert and oriented to person, place, and time. She has normal reflexes.    Skin: Skin is warm and dry.    Psychiatric: She has a normal mood and affect. Her behavior is normal. Judgment and thought content normal.       Cervical Exam:  Dilation:  7  Effacement:  90%  Station: -3  Presentation: Vertex  IUPC and FSE placed     Significant Labs:  Lab Results   Component Value Date    GROUPTRH A POS 2025    HEPBSAG Negative 2025       I  have personallly reviewed all pertinent lab results from the last 24 hours.  Recent Lab Results       None            Assessment/Plan:     24 y.o. female  at 38w4d for:    Active Diagnoses:    Diagnosis Date Noted POA    PRINCIPAL PROBLEM:  Gestational hypertension, third trimester [O13.3] 2025 Yes    38 weeks gestation of pregnancy [Z3A.38] 2025 Not Applicable    Iron deficiency anemia during pregnancy [O99.019, D50.9] 2025 Yes    - Large for gestational age fetus affecting mother, antepartum, third trimester, single gestation [O36.63X0] 02/10/2025 Yes    -Anxiety affecting pregnancy in third trimester [O99.343] 2024 Yes    - Fetal hydronephrosis during pregnancy, antepartum, other fetus [O35.EXX9] 2024 Yes    Morbid obesity with BMI of 50.0-59.9, adult [E66.01, Z68.43] 2024 Not Applicable      Problems Resolved During this Admission:     Progressing, but still high station  Continued induction  Pit prn  Cont epidural  Anticipate vaginal delivery    LILY PINTO MD  Obstetrics  [facility]

## 2025-04-13 NOTE — OP NOTE
DATE OF PROCEDURE: 2025    PRE-OP DIAGNOSIS:  38w4d  Fetal hydronephrosis during pregnancy, antepartum, other fetus [O35.EXX9]  Mental disorder affecting pregnancy in third trimester [O99.343]  Large for gestational age fetus affecting mother, antepartum, third trimester, single gestation [O36.63X0]  Gestational hypertension, third trimester [O13.3]  Iron deficiency anemia during pregnancy [O99.019, D50.9]  Maternal obesity syndrome in third trimester [O99.213]  Morbid obesity with BMI of 50.0-59.9, adult [E66.01, Z68.43]    POST-OP DIAGNOSIS:  38w4d  Maternal anxiety  Large for gestational age fetus affecting mother, antepartum, third trimester, single gestation [O36.63X0]  Gestational hypertension, third trimester [O13.3]  Iron deficiency anemia during pregnancy [O99.019, D50.9]  Maternal obesity syndrome in third trimester [O99.213]  Morbid obesity with BMI of 50.0-59.9, adult [E66.01, Z68.43]  Fetal hydronephrosis during pregnancy, antepartum, other fetus [O35.EXX9]    PROCEDURE:   Procedure(s) (LRB):   SECTION (N/A) - Low Transverse Hysterotomy    SURGEON: Dennis Samayoa MD    ANESTHESIA: Spinal    SPECIMEN: None    ESTIMATED BLOOD LOSS:   800 cc    After consents were reviewed patient was taken to the operating room where a time-out was held.  She was placed on the OR table in the dorsal supine position.  She was prepped and draped in standard sterile fashion.  Panniculus retractor had to be used to access the abdomen.  A Pfannenstiel skin incision was made and carried down to the underlying fascia.  The fascia was incised and rectus muscles were dissected superiorly and inferiorly.  Muscles were  in the midline the peritoneum was tented up and entered sharply with Dickerson scissors.  The vesicouterine peritoneal fold was incised with Metzenbaum scissors and a bladder flap was created.  The bladder was deflected with bladder blade.  A low-transverse hysterotomy was made the fetal head was  Patient called to advise that Macario Cruz only got the letter from the Dr, and not the appointment notes with the evaluation. It will need to be the most recent notes from the patient's more recent visit. Progress notes are needed.  Patient advised [facility] faxe grasped and brought through the hysterotomy.  The remainder of the baby was delivered and placed on the mother's abdomen the cord was milked clamped and cut and baby was handed off to awaiting nurses for initial resuscitation.  Apgar scores were 9 and 9 at 1 and 5 minutes, respectively.  A section of cord was obtained for cord gases.  The placenta was delivered spontaneously intact and handed off.  The uterus was exteriorized and cleaned with a dry lap.  The hysterotomy was then closed with a running locking layer of 0 Monocryl and imbricated with a 2nd layer of 0 Monocryl.  The pelvis was irrigated and freed of all clots and debris.  After ensuring hemostasis muscles and peritoneum were closed with interrupted sutures of 0 chromic gut.  Fascia was closed with a PDS suture. Subcutaneous tissues were irrigated, and Raiza's fascia was closed with 3-0 plain gut.  Skin was closed with staples, and Prevena wound vac was applied.. Patient was awakened and taken to the recovery room in stable condition having tolerated the procedure very well.  Sponge, lap, needle count correct at end of procedure.  I agree with anesthesia's plan of care

## 2025-04-13 NOTE — PROGRESS NOTES
[facility]  Obstetrics  Labor Progress Note    Patient Name: Cait Virk  MRN: [id number]  Admission Date: 2025  Hospital Length of Stay: 1 days  Attending Physician: Dennis Samayoa MD  Primary Care Provider: Tori, Primary Doctor    Subjective:     Principal Problem:Gestational hypertension, third trimester    Interval History:  Cait is a 24 y.o.  at 38w3d. She is doing well.     Objective:     Vital Signs (Most Recent):  Temp: 98.3 °F (36.8 °C) (25)  Pulse: (!) 120 (25)  Resp: 18 (25)  BP: 118/83 (25)  SpO2: 98 % (25) Vital Signs (24h Range):  Temp:  [98 °F (36.7 °C)-98.5 °F (36.9 °C)] 98.3 °F (36.8 °C)  Pulse:  [] 120  Resp:  [18-20] 18  SpO2:  [95 %-100 %] 98 %  BP: (118-141)/(55-87) 118/83        There is no height or weight on file to calculate BMI.    FHT: 160 Cat 1 (reassuring), 1 variable decel in last 6 hours  TOCO:  Q 2-4 minutes    Physical Exam:   Constitutional: She is oriented to person, place, and time. No distress.        Pulmonary/Chest: Effort normal.        Abdominal: There is no abdominal tenderness. There is no rebound and no guarding.     Genitourinary: The external female genitalia was normal.   No external genitalia lesions identified,              Neurological: She is alert and oriented to person, place, and time.     Psychiatric: She has a normal mood and affect. Her behavior is normal. Thought content normal.       Cervical Exam:  Dilation:  2  Effacement:  50%  Station: -3  Presentation: Vertex  AROM with moderate clear fluid noted     Significant Labs:  Lab Results   Component Value Date    GROUPTRH A POS 2025    HEPBSAG Negative 2025       I have personallly reviewed all pertinent lab results from the last 24 hours.  Recent Lab Results         25        Albumin/Globulin Ratio 1.2         Albumin 3.1                  ALT 10         Anion Gap  4.0         Appearance, UA   Slightly Cloudy       AST 13         Bacteria, UA   Many       Baso # 0.02         Basophil % 0.2         Bilirubin (UA)   Negative       BILIRUBIN TOTAL 0.2         BUN 7         BUN/CREAT RATIO 15         Calcium 8.9         Chloride 111         CO2 19         Color, UA   Yellow       Creatinine 0.48         eGFR >90  Comment:                      EGFR INTERPRETATION    Beginning 8/15/22 we are reporting the eGFRcr calculation as recommended by the National Kidney Foundation. The eGFRcr equation has similar overall performance characteristics to the older equation, but the values may differ by more than 10% particularly at higher values of eGFRcr and younger adult ages.    NKF stages of chronic kidney disease (CKD)  Stage 1: Kidney damage with normal or increased eGFR (>90 mL/min/1.73 m^2)  Stage 2: Mild reduction in GFR (60-89 mL/min/1.73 m^2)  Stage 3a: Moderate reduction in GFR (45-59 mL/min/1.73 m^2)  Stage 3b: Moderate reduction in GFR (30-44 mL/min/1.73 m^2)  Stage 4: Severe reduction in GFR (15-29 mL/min/1.73 m^2)  Stage 5: Kidney failure (GFR <15 mL/min/1.73 m^2)      Estimated GFR calculated using the CKD-EPI creatinine (2021) equation.         Eos # 0.11         Eos % 0.9         Globulin, Total 2.6         Glucose 101         Glucose, UA   Negative       Group & Rh A POS         Hematocrit 30.7         Hemoglobin 9.5         Immature Grans (Abs) 0.07         Immature Granulocytes 0.6         Indirect Lloyd GEL NEG         Ketones, UA   Negative       Lactate Dehydrogenase 202         Leukocyte Esterase, UA   Moderate       Lymph # 1.70         LYMPH % 14.4         MCH 24.4         MCHC 30.9         MCV 78.7         Mono # 0.64         Mono % 5.4         MPV 10.6         Mucous, UA   Small       Neut # 9.28         Neut % 78.5         NITRITE UA   Negative       nRBC 0.0         Blood, UA   Negative       pH, UA   6.0       Platelet Count 252         Potassium 3.9          PROTEIN TOTAL 5.7         Protein, UA   Negative       RBC 3.90         RBC, UA   0-5       RDW 19.2         Sodium 134         Specific Gravity,UA   1.020       Specimen Outdate 2025 23:59         Squam Epithel, UA   Many       Uric Acid 3.6         Urobilinogen, UA   0.2       WBC, UA   21-50       WBC 11.82                 Assessment/Plan:     24 y.o. female  at 38w3d for:    Active Diagnoses:    Diagnosis Date Noted POA    PRINCIPAL PROBLEM:  Gestational hypertension, third trimester [O13.3] 2025 Yes    38 weeks gestation of pregnancy [Z3A.38] 2025 Not Applicable    Iron deficiency anemia during pregnancy [O99.019, D50.9] 2025 Yes    - Large for gestational age fetus affecting mother, antepartum, third trimester, single gestation [O36.63X0] 02/10/2025 Yes    -Anxiety affecting pregnancy in third trimester [O99.343] 2024 Yes    - Fetal hydronephrosis during pregnancy, antepartum, other fetus [O35.EXX9] 2024 Yes    Morbid obesity with BMI of 50.0-59.9, adult [E66.01, Z68.43] 2024 Not Applicable      Problems Resolved During this Admission:     Status post 24 hours Cytotec.    Status post AROM.    Pitocin per protocol p.r.n..    Have been continue to monitor.  Controls for range pressures with IV meds p.r.n..    Maintain IV access.    Hydration.  IV hydration as needed.    Maternal fetal monitoring.    Anticipate vaginal delivery    LILY PINTO MD  Obstetrics  [facility]

## 2025-04-13 NOTE — ANESTHESIA PROCEDURE NOTES
CSE    Patient location during procedure: OB  Start time: 4/12/2025 10:45 PM  Timeout: 4/12/2025 10:42 PM  End time: 4/12/2025 10:54 PM    Reason for block: labor analgesia requested by patient and obstetrician    Staffing  Authorizing Provider: Олег Richardson CRNA  Performing Provider: Олег Richardson CRNA    Staffing  Performed by: Олег Richardson CRNA  Authorized by: Олег Richardson CRNA    Preanesthetic Checklist  Completed: patient identified, IV checked, site marked, risks and benefits discussed, surgical consent, monitors and equipment checked, pre-op evaluation and timeout performed  CSE  Patient position: sitting  Prep: ChloraPrep  Patient monitoring: heart rate, continuous pulse ox and frequent blood pressure checks  Approach: midline  Spinal Needle  Needle type: pencil-tip   Needle gauge: 25 G  Needle length: 3.5 in  Epidural Needle  Injection technique: LUIZA air  Needle type: Tuohy   Needle gauge: 17 G  Needle length: 3.5 in  Location: L4-5  Needle localization: anatomical landmarks   Catheter  Catheter type: TARGET BRAZIL  Catheter size: 19 G  Catheter at skin depth: 14 cm  Test dose: lidocaine 1.5% with Epi 1-to-200,000  Test dose: 3 mL  Additional Documentation: incremental injection, negative aspiration for CSF, negative aspiration for heme, no paresthesia on injection and negative test dose

## 2025-04-13 NOTE — ANESTHESIA POSTPROCEDURE EVALUATION
Anesthesia Post Evaluation    Patient: [facility]    Procedure(s) Performed: Procedure(s) (LRB):   SECTION (N/A)    Final Anesthesia Type: epidural      Patient location during evaluation: PACU  Patient participation: Yes- Able to Participate  Level of consciousness: awake and alert and oriented  Post-procedure vital signs: reviewed and stable  Pain management: adequate  Airway patency: patent    PONV status at discharge: No PONV  Anesthetic complications: no      Cardiovascular status: blood pressure returned to baseline  Respiratory status: unassisted and spontaneous ventilation  Hydration status: euvolemic  Follow-up not needed.              Vitals Value Taken Time   /88 25 14:54   Temp 37 °C (98.6 °F) 25 08:50   Pulse 126 25 15:15   Resp 18 25 19:35   SpO2 100 % 25 15:13   Vitals shown include unfiled device data.      No case tracking events are documented in the log.      Pain/Henry Score: No data recorded

## 2025-04-13 NOTE — PROGRESS NOTES
Patient reached complete dilation began pushing at 12:42 p.m..  Says she pushed well but baby had fairly little descent.  After about an hour and a half pushing she experienced a severe panic attack.  Patient became uncooperative had to be taken down out of stirrups and allowed to rest.  Benadryl 25 mg IV was administered.  I presented to evaluate the patient.  She is asking for .  Asked if I could push with the least through 1 contraction which she agreed to.  She pushed reasonably well.  Baby still at -1 station.  Baby little if at all during 3 consecutive pushes.  Afterwards I discussed clinical scenario with the patient.  She is only been pushing for about an hour and a half total time generally allow up to 4 hours of pushing for 1st baby.  The testing has been reactive.  She did have a few fetal heart rate decelerations while pushing but these were intermittent baby maintained moderate variability and accelerations during this time.  We discussed indications for .  There is no current obstetric indication for .  We also discussed the potential risk of  including increased pain, higher bleeding risk, postoperative infection, and other postoperative complications.  These was or exacerbated by her BMI of 58.  Patient acknowledges the risk.  She said she wants to proceed with .  Indication for  for maternal anxiety and request.

## 2025-04-14 PROBLEM — D62 ACUTE BLOOD LOSS ANEMIA: Status: ACTIVE | Noted: 2025-04-14

## 2025-04-14 LAB
BACTERIA UR CULT: NORMAL
BASOPHILS # BLD AUTO: 0.04 X10(3)/MCL (ref 0.01–0.08)
BASOPHILS NFR BLD AUTO: 0.2 % (ref 0.1–1.2)
EOSINOPHIL # BLD AUTO: 0.09 X10(3)/MCL (ref 0.04–0.36)
EOSINOPHIL NFR BLD AUTO: 0.5 % (ref 0.7–7)
ERYTHROCYTE [DISTWIDTH] IN BLOOD BY AUTOMATED COUNT: 19.2 % (ref 11–14.5)
HCT VFR BLD AUTO: 24.5 % (ref 36–48)
HCT VFR BLD AUTO: 26.4 % (ref 36–48)
HGB BLD-MCNC: 7.6 G/DL (ref 11.8–16)
HGB BLD-MCNC: 7.9 G/DL (ref 11.8–16)
IMM GRANULOCYTES # BLD AUTO: 0.1 X10(3)/MCL (ref 0–0.03)
IMM GRANULOCYTES NFR BLD AUTO: 0.6 % (ref 0–0.5)
LYMPHOCYTES # BLD AUTO: 1.76 X10(3)/MCL (ref 1.16–3.74)
LYMPHOCYTES NFR BLD AUTO: 10.3 % (ref 20–55)
MCH RBC QN AUTO: 24.5 PG (ref 27–34)
MCHC RBC AUTO-ENTMCNC: 29.9 G/DL (ref 31–37)
MCV RBC AUTO: 82 FL (ref 79–99)
MONOCYTES # BLD AUTO: 1.01 X10(3)/MCL (ref 0.24–0.36)
MONOCYTES NFR BLD AUTO: 5.9 % (ref 4.7–12.5)
NEUTROPHILS # BLD AUTO: 14.01 X10(3)/MCL (ref 1.56–6.13)
NEUTROPHILS NFR BLD AUTO: 82.5 % (ref 37–73)
NRBC BLD AUTO-RTO: 0 %
PLATELET # BLD AUTO: 231 X10(3)/MCL (ref 140–371)
PMV BLD AUTO: 11 FL (ref 9.4–12.4)
RBC # BLD AUTO: 3.22 X10(6)/MCL (ref 4–5.1)
WBC # BLD AUTO: 17.01 X10(3)/MCL (ref 4–11.5)

## 2025-04-14 PROCEDURE — 11000001 HC ACUTE MED/SURG PRIVATE ROOM

## 2025-04-14 PROCEDURE — 25000003 PHARM REV CODE 250: Performed by: OBSTETRICS & GYNECOLOGY

## 2025-04-14 PROCEDURE — 63600175 PHARM REV CODE 636 W HCPCS: Performed by: OBSTETRICS & GYNECOLOGY

## 2025-04-14 PROCEDURE — 85018 HEMOGLOBIN: CPT | Performed by: OBSTETRICS & GYNECOLOGY

## 2025-04-14 PROCEDURE — 85025 COMPLETE CBC W/AUTO DIFF WBC: CPT | Performed by: OBSTETRICS & GYNECOLOGY

## 2025-04-14 PROCEDURE — 36415 COLL VENOUS BLD VENIPUNCTURE: CPT | Performed by: OBSTETRICS & GYNECOLOGY

## 2025-04-14 RX ORDER — IBUPROFEN 600 MG/1
600 TABLET ORAL EVERY 6 HOURS
Status: COMPLETED | OUTPATIENT
Start: 2025-04-14 | End: 2025-04-15

## 2025-04-14 RX ADMIN — BUSPIRONE HYDROCHLORIDE 10 MG: 5 TABLET ORAL at 09:04

## 2025-04-14 RX ADMIN — FERROUS SULFATE TAB 325 MG (65 MG ELEMENTAL FE) 1 EACH: 325 (65 FE) TAB at 07:04

## 2025-04-14 RX ADMIN — MUPIROCIN: 20 OINTMENT TOPICAL at 10:04

## 2025-04-14 RX ADMIN — PRENATAL VITAMINS-IRON FUMARATE 27 MG IRON-FOLIC ACID 0.8 MG TABLET 1 TABLET: at 10:04

## 2025-04-14 RX ADMIN — DOCUSATE SODIUM 200 MG: 100 CAPSULE, LIQUID FILLED ORAL at 09:04

## 2025-04-14 RX ADMIN — ENOXAPARIN SODIUM 60 MG: 60 INJECTION SUBCUTANEOUS at 05:04

## 2025-04-14 RX ADMIN — OXYCODONE HYDROCHLORIDE AND ACETAMINOPHEN 1 TABLET: 10; 325 TABLET ORAL at 01:04

## 2025-04-14 RX ADMIN — DOCUSATE SODIUM 200 MG: 100 CAPSULE, LIQUID FILLED ORAL at 10:04

## 2025-04-14 RX ADMIN — KETOROLAC TROMETHAMINE 30 MG: 30 INJECTION, SOLUTION INTRAMUSCULAR at 01:04

## 2025-04-14 RX ADMIN — ASPIRIN 81 MG: 81 TABLET ORAL at 10:04

## 2025-04-14 RX ADMIN — BUSPIRONE HYDROCHLORIDE 10 MG: 5 TABLET ORAL at 10:04

## 2025-04-14 RX ADMIN — KETOROLAC TROMETHAMINE 30 MG: 30 INJECTION, SOLUTION INTRAMUSCULAR at 06:04

## 2025-04-14 RX ADMIN — IBUPROFEN 600 MG: 600 TABLET, FILM COATED ORAL at 09:04

## 2025-04-14 RX ADMIN — HYDROCODONE BITARTRATE AND ACETAMINOPHEN 1 TABLET: 7.5; 325 TABLET ORAL at 03:04

## 2025-04-14 NOTE — PROGRESS NOTES
Pharmacist Renal Dose Adjustment Note    Cait Virk is a 24 y.o. female being treated with the medication Lovenox    Patient Data:    Vital Signs (Most Recent):  Temp: 98.3 °F (36.8 °C) (04/14/25 0000)  Pulse: 91 (04/14/25 0000)  Resp: 18 (04/14/25 0000)  BP: 107/61 (04/14/25 0000)  SpO2: 99 % (04/14/25 0000) Vital Signs (72h Range):  Temp:  [97.6 °F (36.4 °C)-98.8 °F (37.1 °C)]   Pulse:  []   Resp:  [18-20]   BP: (103-152)/(51-95)   SpO2:  [95 %-100 %]      Recent Labs   Lab 04/11/25 2119 04/13/25  1735   CREATININE 0.48* 0.71     Serum creatinine: 0.71 mg/dL 04/13/25 1735  Estimated creatinine clearance: 167 mL/min    Medication:Lovenox 40 mg SQ every 24 hrs will be changed to Lovenox 60 mg SQ every 12 hrs (BMI = 58.55 kg/m2)    Pharmacist's Name: Avery Mcgregor  Pharmacist's Extension: [phone]

## 2025-04-14 NOTE — PROGRESS NOTES
[facility]  Obstetrics  Postpartum Progress Note    Patient Name: Cait Virk  MRN: [id number]  Admission Date: 2025  Hospital Length of Stay: 3 days  Attending Physician: Dennis Samayoa MD  Primary Care Provider: Tori, Primary Doctor    Subjective:     Principal Problem:Gestational hypertension, third trimester    Hospital course: POD 1 s/p primary .    Interval History:  Quantitative blood loss after her recovery.  Was 1400 cc.  Hemoglobin this morning was down to 7.9.  Peoples was removed this morning.  Patient says she is feeling well.  She has not let not yet ambulated.  She does not currently have symptoms of anemia.  She is breastfeeding.  Pain is controlled.  Bleeding is minimal.       Objective:     Vital Signs (Most Recent):  Temp: 97.9 °F (36.6 °C) (25 0400)  Pulse: 83 (25 0400)  Resp: 18 (25 0400)  BP: 116/64 (25 0400)  SpO2: 99 % (25 0000) Vital Signs (24h Range):  Temp:  [97.6 °F (36.4 °C)-98.8 °F (37.1 °C)] 97.9 °F (36.6 °C)  Pulse:  [] 83  Resp:  [18] 18  SpO2:  [99 %-100 %] 99 %  BP: (103-141)/(59-95) 116/64     Weight: (!) 145.2 kg (320 lb 1.7 oz)  Body mass index is 58.55 kg/m².      Intake/Output Summary (Last 24 hours) at 2025 0758  Last data filed at 2025 0600  Gross per 24 hour   Intake 294 ml   Output 2705 ml   Net -2411 ml       Physical Exam:   Constitutional: She is oriented to person, place, and time. No distress.       Cardiovascular:       Exam reveals edema (2+ LE edema b/l).        Pulmonary/Chest: Effort normal. No respiratory distress. She has no wheezes. She has no rales.        Abdominal: Soft. She exhibits abdominal incision (intact). She exhibits no distension. There is abdominal tenderness (appropriate). There is no rebound and no guarding.                 Neurological: She is alert and oriented to person, place, and time.    Skin: Skin is warm and dry.    Psychiatric: She has a normal mood and  affect. Her behavior is normal. Judgment and thought content normal.       Significant Labs:  Lab Results   Component Value Date    GROUPTAMY A POS 04/11/2025    HEPBSAG Negative 03/27/2025     Recent Labs   Lab 04/14/25  0516   HGB 7.9*   HCT 26.4*       I have personallly reviewed all pertinent lab results from the last 24 hours.  Recent Lab Results         04/14/25  0516   04/13/25  2241   04/13/25  1735        Albumin/Globulin Ratio     1.2       Albumin     2.9       ALP     190       ALT     16       Anion Gap     7.0       AST     28       Baso # 0.04           Basophil % 0.2           BILIRUBIN TOTAL     0.5       BUN     8       BUN/CREAT RATIO     11       Calcium     8.9       Chloride     113       CO2     16       Creatinine     0.71       eGFR     >90  Comment:                      EGFR INTERPRETATION    Beginning 8/15/22 we are reporting the eGFRcr calculation as recommended by the National Kid[employer]. The eGFRcr equation has similar overall performance characteristics to the older equation, but the values may differ by more than 10% particularly at higher values of eGFRcr and younger adult ages.    NKF stages of chronic kidney disease (CKD)  Stage 1: Kidney damage with normal or increased eGFR (>90 mL/min/1.73 m^2)  Stage 2: Mild reduction in GFR (60-89 mL/min/1.73 m^2)  Stage 3a: Moderate reduction in GFR (45-59 mL/min/1.73 m^2)  Stage 3b: Moderate reduction in GFR (30-44 mL/min/1.73 m^2)  Stage 4: Severe reduction in GFR (15-29 mL/min/1.73 m^2)  Stage 5: Kidney failure (GFR <15 mL/min/1.73 m^2)      Estimated GFR calculated using the CKD-EPI creatinine (2021) equation.       Eos # 0.09           Eos % 0.5           Globulin, Total     2.5       Glucose     119       Hematocrit 26.4   28.4         Hemoglobin 7.9   8.5         Immature Grans (Abs) 0.10           Immature Granulocytes 0.6           Lymph # 1.76           LYMPH % 10.3           MCH 24.5           MCHC 29.9           MCV 82.0            Mono # 1.01           Mono % 5.9           MPV 11.0           Neut # 14.01           Neut % 82.5           nRBC 0.0           Platelet Count 231           Potassium     4.3       PROTEIN TOTAL     5.4       RBC 3.22           RDW 19.2           Sodium     136       WBC 17.01                   Assessment/Plan:     24 y.o. female  at 38w4d for:    Active Diagnoses:    Diagnosis Date Noted POA    PRINCIPAL PROBLEM:  Gestational hypertension, third trimester [O13.3] 2025 Yes    Acute blood loss anemia [D62] 2025 No    Status post  section [Z98.891] 2025 Not Applicable    38 weeks gestation of pregnancy [Z3A.38] 2025 Not Applicable    Iron deficiency anemia during pregnancy [O99.019, D50.9] 2025 Yes    - Large for gestational age fetus affecting mother, antepartum, third trimester, single gestation [O36.63X0] 02/10/2025 Yes    -Anxiety affecting pregnancy in third trimester [O99.343] 2024 Yes    - Fetal hydronephrosis during pregnancy, antepartum, other fetus [O35.EXX9] 2024 Yes    Morbid obesity with BMI of 50.0-59.9, adult [E66.01, Z68.43] 2024 Not Applicable      Problems Resolved During this Admission:       SCDs for DVT prophylaxis.    Repeat H&H at 5:00 p.m..  If stable we will begin Lovenox.    If H and H continues to drop we will recommend transfusion packed red blood cells.    Patient encouraged to ambulate several times today.    Monitor blood pressure.    Cont home meds for anxiety      LILY PINTO MD  Obstetrics  [facility]

## 2025-04-15 ENCOUNTER — TELEPHONE (OUTPATIENT)
Dept: OBSTETRICS AND GYNECOLOGY | Facility: CLINIC | Age: 25
End: 2025-04-15

## 2025-04-15 VITALS
DIASTOLIC BLOOD PRESSURE: 79 MMHG | WEIGHT: 293 LBS | TEMPERATURE: 98 F | SYSTOLIC BLOOD PRESSURE: 130 MMHG | HEART RATE: 103 BPM | OXYGEN SATURATION: 100 % | BODY MASS INDEX: 53.92 KG/M2 | HEIGHT: 62 IN | RESPIRATION RATE: 18 BRPM

## 2025-04-15 LAB
BASOPHILS # BLD AUTO: 0.03 X10(3)/MCL (ref 0.01–0.08)
BASOPHILS NFR BLD AUTO: 0.3 % (ref 0.1–1.2)
EOSINOPHIL # BLD AUTO: 0.22 X10(3)/MCL (ref 0.04–0.36)
EOSINOPHIL NFR BLD AUTO: 2.2 % (ref 0.7–7)
ERYTHROCYTE [DISTWIDTH] IN BLOOD BY AUTOMATED COUNT: 19.5 % (ref 11–14.5)
HCT VFR BLD AUTO: 24.2 % (ref 36–48)
HGB BLD-MCNC: 7.3 G/DL (ref 11.8–16)
IMM GRANULOCYTES # BLD AUTO: 0.05 X10(3)/MCL (ref 0–0.03)
IMM GRANULOCYTES NFR BLD AUTO: 0.5 % (ref 0–0.5)
LYMPHOCYTES # BLD AUTO: 1.68 X10(3)/MCL (ref 1.16–3.74)
LYMPHOCYTES NFR BLD AUTO: 17 % (ref 20–55)
MCH RBC QN AUTO: 24.3 PG (ref 27–34)
MCHC RBC AUTO-ENTMCNC: 30.2 G/DL (ref 31–37)
MCV RBC AUTO: 80.7 FL (ref 79–99)
MONOCYTES # BLD AUTO: 0.53 X10(3)/MCL (ref 0.24–0.36)
MONOCYTES NFR BLD AUTO: 5.4 % (ref 4.7–12.5)
NEUTROPHILS # BLD AUTO: 7.38 X10(3)/MCL (ref 1.56–6.13)
NEUTROPHILS NFR BLD AUTO: 74.6 % (ref 37–73)
NRBC BLD AUTO-RTO: 0 %
PLATELET # BLD AUTO: 226 X10(3)/MCL (ref 140–371)
PMV BLD AUTO: 10.9 FL (ref 9.4–12.4)
RBC # BLD AUTO: 3 X10(6)/MCL (ref 4–5.1)
WBC # BLD AUTO: 9.89 X10(3)/MCL (ref 4–11.5)

## 2025-04-15 PROCEDURE — 25000003 PHARM REV CODE 250: Performed by: OBSTETRICS & GYNECOLOGY

## 2025-04-15 PROCEDURE — 85025 COMPLETE CBC W/AUTO DIFF WBC: CPT | Performed by: OBSTETRICS & GYNECOLOGY

## 2025-04-15 PROCEDURE — 36415 COLL VENOUS BLD VENIPUNCTURE: CPT | Performed by: OBSTETRICS & GYNECOLOGY

## 2025-04-15 RX ORDER — TRIPROLIDINE/PSEUDOEPHEDRINE 2.5MG-60MG
30 TABLET ORAL EVERY 6 HOURS PRN
Qty: 480 ML | Refills: 2 | Status: SHIPPED | OUTPATIENT
Start: 2025-04-15

## 2025-04-15 RX ORDER — HYDROCODONE BITARTRATE AND ACETAMINOPHEN 7.5; 325 MG/1; MG/1
1 TABLET ORAL EVERY 6 HOURS PRN
Qty: 12 TABLET | Refills: 0 | Status: SHIPPED | OUTPATIENT
Start: 2025-04-15

## 2025-04-15 RX ADMIN — IBUPROFEN 600 MG: 600 TABLET, FILM COATED ORAL at 06:04

## 2025-04-15 RX ADMIN — IBUPROFEN 600 MG: 600 TABLET, FILM COATED ORAL at 01:04

## 2025-04-15 NOTE — LACTATION NOTE
This note was copied from a baby's chart.  Attempts to latch unsuccessful. Mother very anxious about latching.  Mother would like to pump, using personal pump    Flange fittinmm  MomCozy S9   Demonstrated pump assembly, fit and settings. Explained the use of each mode. Encouraged purchasing a supportive bra when using hands free pump.   Assisted with positioning, educated to pump 15-20min as tolerated    Discussed feeding plans moving forward. Lots of questions regarding exclusive pumping VS nursing and pumping, milk production, and milk composition.       Pump 19mL!!  Syringe fed 6mL  Saved 13mL in fridge, labeled    Educated on milk storage and handling  Demonstrated disassembly of pump and reassembly, as well as washing.    Mother expresses she would like to exclusively pump moving forward, recommended slow flow bottle.

## 2025-04-15 NOTE — DISCHARGE SUMMARY
Delivery Discharge Summary  Obstetrics        Discharge Provider: MP HARTMAN    Admission date: 2025  Discharge date: 04/15/2025    Admit Dx:   Problem List[1]  Discharge Dx:    Problem List[2]      Hospital Course:  Cait Virk is a 24 y.o. now  who was admitted on 2025 for delivery. Patient delivered a viable  via primary c section. Please see delivery note for further details. Pt was in stable condition post delivery.  Her postpartum course was uncomplicated. On the date of discharge, patient's pain is controlled with oral pain medications. She is tolerating ambulation without SOB or CP, and PO diet without N/V. Reported lochia is within the normal range. Pt in stable condition and ready for discharge.       DISCHARGE PHYSICAL EXAM  Temp:  [97.8 °F (36.6 °C)-98.5 °F (36.9 °C)] 98.4 °F (36.9 °C)  Pulse:  [] 92  Resp:  [16-18] 18  SpO2:  [100 %] 100 %  BP: (115-136)/(64-87) 117/71  No intake or output data in the 24 hours ending 04/15/25 0820  Body mass index is 58.55 kg/m².    General: no acute distress  Abdomen: soft, appropriate tenderness,  non-distended; Fundus firm periumbilical, Incision C, D, & I; Provena wound vac in place    Extremities: non-tender, symmetric, trace BLE edema, neg Cathy's Bilateral     Pertinent studies:  Lab Results   Component Value Date/Time    GROUPTRH A POS 2025 09:19 PM     Recent Results (from the past 2 weeks)   CBC with Differential    Collection Time: 04/15/25  5:18 AM   Result Value Ref Range    WBC 9.89 4.00 - 11.50 x10(3)/mcL    Hgb 7.3 (L) 11.8 - 16.0 g/dL    Hct 24.2 (L) 36.0 - 48.0 %    Platelet 226 140 - 371 x10(3)/mcL   CBC with Differential    Collection Time: 25  5:16 AM   Result Value Ref Range    WBC 17.01 (H) 4.00 - 11.50 x10(3)/mcL    Hgb 7.9 (L) 11.8 - 16.0 g/dL    Hct 26.4 (L) 36.0 - 48.0 %    Platelet 231 140 - 371 x10(3)/mcL   CBC with Differential    Collection Time: 25  9:19 PM   Result Value Ref Range     WBC 11.82 (H) 4.00 - 11.50 x10(3)/mcL    Hgb 9.5 (L) 11.8 - 16.0 g/dL    Hct 30.7 (L) 36.0 - 48.0 %    Platelet 252 140 - 371 x10(3)/mcL         Disposition: To home, self care    Follow Up: This Friday for BP check, wound vac removal, and staple removal; 6 weeks for routine PP visit with Dr. Noriega    Patient Instructions:   1. Call the office for any bleeding >2 pads/hour for >2 hours, temperature >100.4, pain that is uncontrolled with medications, or for any other concerns.  2. Pre Eclampsia precautions  3. No driving while on narcotics.  4. Wash incision BID, no tub baths x2 wks, shower everyday  5. No heavy lifting x 6wks  6. Infection precautions          Medication List        START taking these medications      HYDROcodone-acetaminophen 7.5-325 mg per tablet  Commonly known as: NORCO  Take 1 tablet by mouth every 6 (six) hours as needed for Pain.     ibuprofen 20 mg/mL oral liquid  Take 30 mLs (600 mg total) by mouth every 6 (six) hours as needed for Pain.            CONTINUE taking these medications      acetaminophen 500 MG tablet  Commonly known as: TYLENOL     busPIRone 10 MG tablet  Commonly known as: BUSPAR  Take 1 tablet (10 mg total) by mouth 2 (two) times daily.     ferrous sulfate Tab tablet  Commonly known as: FEOSOL     hydrOXYzine pamoate 50 MG Cap  Commonly known as: VISTARIL  Take 1 capsule (50 mg total) by mouth every 6 (six) hours as needed (anxiety).     PRENATAL 19 ORAL            STOP taking these medications      aspirin 81 MG EC tablet  Commonly known as: ECOTRIN               Where to Get Your Medications        These medications were sent to [facility] - [place], [place] - [address]  [address] [facility] [address]      Phone: [phone]   HYDROcodone-acetaminophen 7.5-325 mg per tablet  ibuprofen 20 mg/mL oral liquid              DIANE GRESHAM          [1]   Patient Active Problem List  Diagnosis    Morbid obesity with BMI of 50.0-59.9, adult     Maternal obesity syndrome in third trimester    -Anxiety affecting pregnancy in third trimester    - Fetal hydronephrosis during pregnancy, antepartum, other fetus    - Large for gestational age fetus affecting mother, antepartum, third trimester, single gestation    Iron deficiency anemia during pregnancy    Gestational hypertension, third trimester    38 weeks gestation of pregnancy    Status post  section    Encounter for triage in pregnant patient    Acute blood loss anemia   [2]   Patient Active Problem List  Diagnosis    Morbid obesity with BMI of 50.0-59.9, adult    Maternal obesity syndrome in third trimester    -Anxiety affecting pregnancy in third trimester    - Fetal hydronephrosis during pregnancy, antepartum, other fetus    - Large for gestational age fetus affecting mother, antepartum, third trimester, single gestation    Iron deficiency anemia during pregnancy    Gestational hypertension, third trimester    38 weeks gestation of pregnancy    Status post  section    Encounter for triage in pregnant patient    Acute blood loss anemia

## 2025-04-15 NOTE — LACTATION NOTE
This note was copied from a baby's chart.  Educated parents on early and late hunger cues, feeding on demand, milk supply and NB typical feeding routines.     Mother offering breast every 2hrs, Nb sleepy and reluctant to latch  Mother states that she originally only wanted to pump and bottle feed but latched after delivery because he initially did so well.  She expresses concern of suffocation due to large breasts and the worry that she cannot visualize the amount of milk baby consumes.     Encouraged latching baby until milk comes in as baby can transfer and establish milk supply better than a pump can; however, explained what to expect of exclusive pumping especially in early days with just colostrum.   Explained that I am here to help her meet her own goals and that we can change the plan of care as needed to make sure she feels confident and comfortable with feed plan.

## 2025-04-18 ENCOUNTER — HOSPITAL ENCOUNTER (OUTPATIENT)
Dept: OBSTETRICS AND GYNECOLOGY | Facility: HOSPITAL | Age: 25
Discharge: HOME | End: 2025-04-18
Attending: OBSTETRICS & GYNECOLOGY | Admitting: OBSTETRICS & GYNECOLOGY
Payer: COMMERCIAL

## 2025-04-18 VITALS — HEART RATE: 83 BPM | DIASTOLIC BLOOD PRESSURE: 62 MMHG | SYSTOLIC BLOOD PRESSURE: 132 MMHG

## 2025-04-18 LAB
ERYTHROCYTE [DISTWIDTH] IN BLOOD BY AUTOMATED COUNT: 18.7 % (ref 11–14.5)
FERRITIN SERPL-MCNC: 17.9 NG/ML (ref 6.24–264)
HCT VFR BLD AUTO: 26.7 % (ref 36–48)
HGB BLD-MCNC: 8 G/DL (ref 11.8–16)
MCH RBC QN AUTO: 24.2 PG (ref 27–34)
MCHC RBC AUTO-ENTMCNC: 30 G/DL (ref 31–37)
MCV RBC AUTO: 80.7 FL (ref 79–99)
PLATELET # BLD AUTO: 310 X10(3)/MCL (ref 140–371)
PLATELET # BLD EST: NORMAL 10*3/UL
PMV BLD AUTO: 10.7 FL (ref 9.4–12.4)
RBC # BLD AUTO: 3.31 X10(6)/MCL (ref 4–5.1)
WBC # BLD AUTO: 7.76 X10(3)/MCL (ref 4–11.5)

## 2025-04-18 PROCEDURE — 85027 COMPLETE CBC AUTOMATED: CPT | Performed by: OBSTETRICS & GYNECOLOGY

## 2025-04-18 PROCEDURE — 99213 OFFICE O/P EST LOW 20 MIN: CPT | Mod: 24,,, | Performed by: OBSTETRICS & GYNECOLOGY

## 2025-04-18 PROCEDURE — 59025 FETAL NON-STRESS TEST: CPT

## 2025-04-18 PROCEDURE — 63600175 PHARM REV CODE 636 W HCPCS: Mod: JZ,TB | Performed by: OBSTETRICS & GYNECOLOGY

## 2025-04-18 PROCEDURE — 96365 THER/PROPH/DIAG IV INF INIT: CPT

## 2025-04-18 PROCEDURE — 83540 ASSAY OF IRON: CPT | Performed by: OBSTETRICS & GYNECOLOGY

## 2025-04-18 PROCEDURE — 99211 OFF/OP EST MAY X REQ PHY/QHP: CPT | Mod: 25

## 2025-04-18 PROCEDURE — 36415 COLL VENOUS BLD VENIPUNCTURE: CPT | Performed by: OBSTETRICS & GYNECOLOGY

## 2025-04-18 PROCEDURE — 82728 ASSAY OF FERRITIN: CPT | Performed by: OBSTETRICS & GYNECOLOGY

## 2025-04-18 PROCEDURE — 11000001 HC ACUTE MED/SURG PRIVATE ROOM

## 2025-04-18 PROCEDURE — 25000003 PHARM REV CODE 250: Performed by: OBSTETRICS & GYNECOLOGY

## 2025-04-18 RX ADMIN — FERUMOXYTOL 1020 MG: 510 INJECTION INTRAVENOUS at 12:04

## 2025-04-18 NOTE — NURSING
1005-PT PRESENTED TO OB DEPT FOR FOLLOW UP BP CHECK, WOUND VAC REMOVAL AND STAPLE REMOVAL. PT DENIES ANY COMPLAINTS ON ARRIVAL. WOUND VAC REMOVED AND STAPLES REMOVED. INCISION IS APPROXIMATED AND HAS NO DRAINAGE OR SWELLING.     1120-DR. BAKER ROUNDED ON PT. ORDERS GIVEN FOR LABS AND IRON TRANSFUSION DUE TO LOW HGB.     1240-IRON TRANSFUSION STARTED.     1315-IRON INFUSION COMPLETE AND IV DISCONTINUED.     1325-PT LEFT IN STABLE CONDITION WITH SPOUSE AND INFANT.

## 2025-04-18 NOTE — PROGRESS NOTES
PostPartum Progress Note        Subjective:      Chief Complaint:  Staple/wound vac  removal     History of Present Illness:   Cait Virk is a 24 y.o. female  status post CS (25) presents for wound vac/staple removal. Infant doing well, breast feeding. Lochia mild. Mood is good.    C/o feeling fatigue able to ambulate without CP/SOB/palp      Review of Systems:  General/Constitutional: Chills denies. Fatigue  +. Fever denies. Night sweats denies. Hot flashes denies  Gastrointestinal: Abdominal pain denies. Blood in stool denies. Constipation denies. Diarrhea denies. Heartburn denies. Nausea denies. Vomiting denies   Genitourinary: Incontinence denies. Blood in urine denies. Frequent urination denies. Urgency denies. Painful urination denies. Nocturia denies   Gynecologic: Irregular menses denies. Heavy bleeding  denies. Painful menses denies. Vaginal discharge denies.Vaginal odor denies. Vaginal itching/Irritation denies. Vaginal lesion denies.  Pelvic pain denies. Decreased libido denies. Vulvar lesion denies. Prolapse of genital organs denies. Painful intercourse denies. Postcoital bleeding denies   Psychiatric: Mood lability denies. Depressed mood denies. Suicidal thoughts denies. Anxiety denies. Overwhelmed denies. Appetite normal. Energy level decreased     OB History    Para Term  AB Living   2 1 1 0 1 1   SAB IAB Ectopic Multiple Live Births   1 0 0 0 1      # 1 - Date: 24, Sex: None, Weight: None, GA: 6w0d, Type: Spontaneous , Apgar1: None, Apgar5: None, Living: None, Birth Comments: None    # 2 - Date: 25, Sex: Male, Weight: 3.7 kg (8 lb 2.5 oz), GA: 38w4d, Type: , Low Transverse, Apgar1: 9, Apgar5: 9, Living: Living, Birth Comments: None      Past Medical History:   Diagnosis Date    Anemia     Anxiety disorder, unspecified     Depression        Current Medications[1]      Physical Exam:  /62   LMP 2024 (Exact Date)      Chaperone  present.      Constitutional: General appearance: healthy, well-nourished and well-developed  Psychiatric: Orientation to time, place and person. Normal mood and affect and active, alert   Breast: Inspection/palpation: no tenderness, masses, skin changes or abnormal secretions   Abdomen:   Auscultation/Inspection/Palpation: No tenderness or masses. Soft, nondistended   Inspection of incision site: healing ysabel TTP      Assessment/Plan:  1.)PP Anemia    - Pt c/o of feeling fatigue  - ambulating without complications, lochia minimal   - IV Fe txf  - bleeding prec    CBC  Component  Ref Range & Units (hover) 3 d ago  (4/15/25) 4 d ago  (4/14/25) 4 d ago  (4/14/25) 5 d ago  (4/13/25) 7 d ago  (4/11/25) 3 wk ago  (3/27/25) 3 mo ago  (1/13/25)   WBC 9.89  17.01 High   11.82 High  10.22 11.43 R   RBC 3.00 Low   3.22 Low   3.90 Low  3.99 Low  3.88 Low  R   Hgb 7.3 Low  7.6 Low  7.9 Low  8.5 Low  9.5 Low  9.6 Low  9.4 Low  R   Hct 24.2 Low  24.5 Low  26.4 Low  28.4 Low  30.7 Low  31.2 Low  31.2 Low               [1] No current facility-administered medications for this encounter.

## 2025-04-19 LAB
IRON SATN MFR SERPL: 11 % (ref 20–50)
IRON SERPL-MCNC: 33 UG/DL (ref 50–170)
TIBC SERPL-MCNC: 266 UG/DL (ref 70–310)
TIBC SERPL-MCNC: 299 UG/DL (ref 250–450)
TRANSFERRIN SERPL-MCNC: 264 MG/DL (ref 180–382)

## 2025-04-19 NOTE — H&P
Subjective:      Chief Complaint:  Staple/wound vac  removal      History of Present Illness:  Subjective  Cait Virk is a 24 y.o. female  status post CS (25) presents for wound vac/staple removal. Infant doing well, breast feeding. Lochia mild. Mood is good.    C/o feeling fatigue able to ambulate without CP/SOB/palp        Review of Systems:  General/Constitutional: Chills denies. Fatigue  +. Fever denies. Night sweats denies. Hot flashes denies  Gastrointestinal: Abdominal pain denies. Blood in stool denies. Constipation denies. Diarrhea denies. Heartburn denies. Nausea denies. Vomiting denies   Genitourinary: Incontinence denies. Blood in urine denies. Frequent urination denies. Urgency denies. Painful urination denies. Nocturia denies   Gynecologic: Irregular menses denies. Heavy bleeding  denies. Painful menses denies. Vaginal discharge denies.Vaginal odor denies. Vaginal itching/Irritation denies. Vaginal lesion denies.  Pelvic pain denies. Decreased libido denies. Vulvar lesion denies. Prolapse of genital organs denies. Painful intercourse denies. Postcoital bleeding denies   Psychiatric: Mood lability denies. Depressed mood denies. Suicidal thoughts denies. Anxiety denies. Overwhelmed denies. Appetite normal. Energy level decreased               OB History    Para Term  AB Living    2 1 1 0 1 1    SAB IAB Ectopic Multiple Live Births     1 0 0 0 1        # 1 - Date: 24, Sex: None, Weight: None, GA: 6w0d, Type: Spontaneous , Apgar1: None, Apgar5: None, Living: None, Birth Comments: None     # 2 - Date: 25, Sex: Male, Weight: 3.7 kg (8 lb 2.5 oz), GA: 38w4d, Type: , Low Transverse, Apgar1: 9, Apgar5: 9, Living: Living, Birth Comments: None             Past Medical History:   Diagnosis Date    Anemia      Anxiety disorder, unspecified      Depression           Current Medications[1]      [1]  No current facility-administered medications for this  encounter.          Physical Exam:  /62   LMP 07/05/2024 (Exact Date)       Chaperone present.      Constitutional: General appearance: healthy, well-nourished and well-developed  Psychiatric: Orientation to time, place and person. Normal mood and affect and active, alert   Breast: Inspection/palpation: no tenderness, masses, skin changes or abnormal secretions   Abdomen:   Auscultation/Inspection/Palpation: No tenderness or masses. Soft, nondistended   Inspection of incision site: healing ysabel TTP        Assessment/Plan:  1.)PP Anemia     - Pt c/o of feeling fatigue  - ambulating without complications, lochia minimal   - IV Fe txf  - bleeding prec     CBC  Component  Ref Range & Units (hover) 3 d ago  (4/15/25) 4 d ago  (4/14/25) 4 d ago  (4/14/25) 5 d ago  (4/13/25) 7 d ago  (4/11/25) 3 wk ago  (3/27/25) 3 mo ago  (1/13/25)   WBC 9.89   17.01 High    11.82 High  10.22 11.43 R   RBC 3.00 Low    3.22 Low    3.90 Low  3.99 Low  3.88 Low  R   Hgb 7.3 Low  7.6 Low  7.9 Low  8.5 Low  9.5 Low  9.6 Low  9.4 Low  R   Hct 24.2 Low  24.5 Low  26.4 Low  28.4 Low  30.7 Low  31.2 Low  31.2 Low

## 2025-04-21 NOTE — DISCHARGE SUMMARY
Chief Complaint:  Staple/wound vac  removal      History of Present Illness:  Rajan Virk is a 24 y.o. female  status post CS (25) presents for wound vac/staple removal. Infant doing well, breast feeding. Lochia mild. Mood is good.    C/o feeling fatigue able to ambulate without CP/SOB/palp        Review of Systems:  General/Constitutional: Chills denies. Fatigue  +. Fever denies. Night sweats denies. Hot flashes denies  Gastrointestinal: Abdominal pain denies. Blood in stool denies. Constipation denies. Diarrhea denies. Heartburn denies. Nausea denies. Vomiting denies   Genitourinary: Incontinence denies. Blood in urine denies. Frequent urination denies. Urgency denies. Painful urination denies. Nocturia denies   Gynecologic: Irregular menses denies. Heavy bleeding  denies. Painful menses denies. Vaginal discharge denies.Vaginal odor denies. Vaginal itching/Irritation denies. Vaginal lesion denies.  Pelvic pain denies. Decreased libido denies. Vulvar lesion denies. Prolapse of genital organs denies. Painful intercourse denies. Postcoital bleeding denies   Psychiatric: Mood lability denies. Depressed mood denies. Suicidal thoughts denies. Anxiety denies. Overwhelmed denies. Appetite normal. Energy level decreased                      OB History    Para Term  AB Living     2 1 1 0 1 1     SAB IAB Ectopic Multiple Live Births       1 0 0 0 1          # 1 - Date: 24, Sex: None, Weight: None, GA: 6w0d, Type: Spontaneous , Apgar1: None, Apgar5: None, Living: None, Birth Comments: None     # 2 - Date: 25, Sex: Male, Weight: 3.7 kg (8 lb 2.5 oz), GA: 38w4d, Type: , Low Transverse, Apgar1: 9, Apgar5: 9, Living: Living, Birth Comments: None                Past Medical History:   Diagnosis Date    Anemia      Anxiety disorder, unspecified      Depression           Current Medications[1]      [1]  No current facility-administered medications for this  encounter.           Physical Exam:  /62   LMP 07/05/2024 (Exact Date)       Chaperone present.      Constitutional: General appearance: healthy, well-nourished and well-developed  Psychiatric: Orientation to time, place and person. Normal mood and affect and active, alert   Breast: Inspection/palpation: no tenderness, masses, skin changes or abnormal secretions   Abdomen:   Auscultation/Inspection/Palpation: No tenderness or masses. Soft, nondistended   Inspection of incision site: healing ysabel TTP        Assessment/Plan:  1.)PP Anemia     - Pt c/o of feeling fatigue  - ambulating without complications, lochia minimal   - IV Fe txf  - bleeding prec     CBC  Component  Ref Range & Units (hover) 3 d ago  (4/15/25) 4 d ago  (4/14/25) 4 d ago  (4/14/25) 5 d ago  (4/13/25) 7 d ago  (4/11/25) 3 wk ago  (3/27/25) 3 mo ago  (1/13/25)   WBC 9.89   17.01 High    11.82 High  10.22 11.43 R   RBC 3.00 Low    3.22 Low    3.90 Low  3.99 Low  3.88 Low  R   Hgb 7.3 Low  7.6 Low  7.9 Low  8.5 Low  9.5 Low  9.6 Low  9.4 Low  R   Hct 24.2 Low  24.5 Low  26.4 Low  28.4 Low  30.7 Low  31.2 Low  31.2 Low

## 2025-04-22 ENCOUNTER — PATIENT MESSAGE (OUTPATIENT)
Dept: OBSTETRICS AND GYNECOLOGY | Facility: CLINIC | Age: 25
End: 2025-04-22
Payer: COMMERCIAL

## 2025-04-23 ENCOUNTER — OFFICE VISIT (OUTPATIENT)
Dept: OBSTETRICS AND GYNECOLOGY | Facility: CLINIC | Age: 25
End: 2025-04-23
Payer: COMMERCIAL

## 2025-04-23 VITALS
TEMPERATURE: 97 F | BODY MASS INDEX: 53.92 KG/M2 | DIASTOLIC BLOOD PRESSURE: 78 MMHG | WEIGHT: 293 LBS | SYSTOLIC BLOOD PRESSURE: 126 MMHG | HEIGHT: 62 IN

## 2025-04-23 DIAGNOSIS — T81.89XA IMPAIRED SKIN INTEGRITY ASSOCIATED WITH SURGICAL INCISION: Primary | ICD-10-CM

## 2025-04-23 DIAGNOSIS — R23.9 IMPAIRED SKIN INTEGRITY ASSOCIATED WITH SURGICAL INCISION: Primary | ICD-10-CM

## 2025-04-23 PROCEDURE — 87075 CULTR BACTERIA EXCEPT BLOOD: CPT

## 2025-04-23 PROCEDURE — 87077 CULTURE AEROBIC IDENTIFY: CPT

## 2025-04-23 NOTE — TELEPHONE ENCOUNTER
Spoke with Dr. Noriega regarding patient's concerns regarding incision.  Dr. Noriega would like patient to see DEBBY Jackson today for evaluation.  Patient voiced understanding and scheduled to see DEBBY Jackson NP at 1350 today.

## 2025-04-23 NOTE — PROGRESS NOTES
Subjective     Patient ID: Cait Virk is a 24 y.o. female.    Chief Complaint:  Wound Check      History of Present Illness  Wound Check    Cait Virk 24 y.o. White female  presents to clinic s/p primary  on 25. Pt is c/o yellow drainage from left side of incision since yesterday. She had her wound vac and staples removed on 25. Denies any fever.      GYN & OB History  Patient's last menstrual period was 2024 (exact date).   Date of Last Pap: 10/7/2024    OB History    Para Term  AB Living   2 1 1  1 1   SAB IAB Ectopic Multiple Live Births   1   0 1      # Outcome Date GA Lbr Magdy/2nd Weight Sex Type Anes PTL Lv   2 Term 25 38w4d 06:50 / 03:59 3.7 kg (8 lb 2.5 oz) M CS-LTranv EPI N GARRETT   1 SAB 24 6w0d    SAB          Review of Systems  Review of Systems   Constitutional: Negative.    HENT: Negative.     Eyes: Negative.    Respiratory: Negative.     Cardiovascular: Negative.    Gastrointestinal: Negative.    Endocrine: Negative.    Genitourinary: Negative.    Musculoskeletal: Negative.    Integumentary:         Incision drainage Negative.   Neurological: Negative.    Hematological: Negative.    Psychiatric/Behavioral: Negative.     Breast: negative.           Objective   Physical Exam:             Abdominal:   Small one cm area of moist/light yellow tissue (does not appear to be slough) with minimal serous drainage noted. Incision is completely closed. No redness noted                      Skin:                  Assessment and Plan     1. Impaired skin integrity associated with surgical incision             Plan:  Wound culture collect  Appears to be moisture associated and I feel time and keeping area dry will heal properly.   Pt instructed on wound care and how to keep area dry d/t body habitus in combination with heat and moisture.  Pt verbalized understanding.    Instructed to notify clinic if no improvement.

## 2025-04-26 LAB
BACTERIA SPEC ANAEROBE CULT: NORMAL
BACTERIA WND CULT: ABNORMAL

## 2025-04-28 ENCOUNTER — TELEPHONE (OUTPATIENT)
Dept: OBSTETRICS AND GYNECOLOGY | Facility: CLINIC | Age: 25
End: 2025-04-28
Payer: COMMERCIAL

## 2025-04-28 ENCOUNTER — RESULTS FOLLOW-UP (OUTPATIENT)
Dept: OBSTETRICS AND GYNECOLOGY | Facility: CLINIC | Age: 25
End: 2025-04-28

## 2025-04-28 DIAGNOSIS — R23.9 IMPAIRED SKIN INTEGRITY ASSOCIATED WITH SURGICAL INCISION: Primary | ICD-10-CM

## 2025-04-28 DIAGNOSIS — T81.89XA IMPAIRED SKIN INTEGRITY ASSOCIATED WITH SURGICAL INCISION: Primary | ICD-10-CM

## 2025-04-28 RX ORDER — LEVOFLOXACIN 500 MG/1
500 TABLET, FILM COATED ORAL DAILY
Qty: 7 TABLET | Refills: 0 | Status: SHIPPED | OUTPATIENT
Start: 2025-04-28 | End: 2025-05-05

## 2025-04-28 NOTE — TELEPHONE ENCOUNTER
----- Message from IMTIAZ Solis sent at 4/28/2025  9:24 AM CDT -----  Please notify patient of Klebsiella on wound culture.  Patient is breastfeeding so we will treat with Levaquin 500 mg daily x7 days.  Thanks!  ----- Message -----  From: Lab, Background User  Sent: 4/25/2025   6:33 AM CDT  To: IMTIAZ [facility]

## 2025-05-01 ENCOUNTER — TELEPHONE (OUTPATIENT)
Dept: OBSTETRICS AND GYNECOLOGY | Facility: CLINIC | Age: 25
End: 2025-05-01
Payer: COMMERCIAL

## 2025-05-01 NOTE — TELEPHONE ENCOUNTER
Attempted to contact to notify [facility] paperwork complete, need return to work date on paperwork only. PP visit scheduled for 5/28/25.

## 2025-05-23 NOTE — PROGRESS NOTES
Chief Complaint:  Postpartum Care    History of Present Illness:   Cait Virk is a 24 y.o. female  status post CS (25) presents for her 6 week postpartum visit. Infant doing well, pumping and bottle feeding. Lochia resolved. Does not desire to discuss contraceptive management.     C/o increased stress and crying spells. States baby cannot latch, currently having to pump. States partner is against formula feeding.     +loss of appetite and difficulty sleeping. Starts work on Monday, has anxiety about returning. Also reports severe anxiety in the car. Denies HI/SI. Able to care for self and infant. No longer taking Buspar. Desires to discuss other options.        Gyn History:  Menstrual History  Cycle: Yes  Menarche Age: 12 years  Flow Duration:  (7-10)  Flow: (!) Heavy  Interval:  (first cycle since delivering)  Intermenstrual Bleeding: No  Dysmenorrhea: Yes  Dysmenorrhea Severity : (!) Severe    Menopause  Menopause Age: 1 years  Post Menopausal Bleeding: No  Hormone Replacement Therapy: No    Pap History  Last pap date: 24 (NIL -GC CZ TV)  History of Abnormal Pap: No  HPV Vaccine Completed: No    [place]  Sexually Active: No  Sexual Orientation: heterosexual  STI History: No  Contraception: No    Breast History  Last Breast Imaging Date: No  History of Breast Biopsy: No        Review of Systems:  General/Constitutional: Chills denies. Fatigue/weakness denies. Fever denies. Night sweats denies. Hot flashes denies  Gastrointestinal: Abdominal pain denies. Blood in stool denies. Constipation denies. Diarrhea denies. Heartburn denies. Nausea denies. Vomiting denies   Genitourinary: Incontinence denies. Blood in urine denies. Frequent urination denies. Urgency denies. Painful urination denies. Nocturia denies   Gynecologic: Irregular menses denies. Heavy bleeding  denies. Painful menses denies. Vaginal discharge denies.Vaginal odor denies. Vaginal itching/Irritation denies. Vaginal lesion  "denies.  Pelvic pain denies. Decreased libido denies. Vulvar lesion denies. Prolapse of genital organs denies. Painful intercourse denies. Postcoital bleeding denies   Psychiatric: Mood lability denies. Depressed mood admits. Suicidal thoughts denies. Anxiety admits. Overwhelmed admits. Appetite decreased. Energy level decreased     OB History    Para Term  AB Living   2 1 1 0 1 1   SAB IAB Ectopic Multiple Live Births   1 0 0 0 1      # 1 - Date: 24, Sex: None, Weight: None, GA: 6w0d, Type: Spontaneous , Apgar1: None, Apgar5: None, Living: None, Birth Comments: None    # 2 - Date: 25, Sex: Male, Weight: 3.7 kg (8 lb 2.5 oz), GA: 38w4d, Type: , Low Transverse, Apgar1: 9, Apgar5: 9, Living: Living, Birth Comments: None      Past Medical History:   Diagnosis Date    Anemia     Anxiety disorder, unspecified     Depression        Current Medications[1]      Physical Exam:  /80 (BP Location: Right arm, Patient Position: Sitting)   Ht 5' 2" (1.575 m)   Wt 131.5 kg (290 lb)   LMP 2025 (Exact Date)   Breastfeeding Yes   BMI 53.04 kg/m²      Chaperone present.      Constitutional: General appearance: healthy, well-nourished and well-developed  Psychiatric: Orientation to time, place and person. Normal mood and affect and active, alert   Breast: Inspection/palpation: no tenderness, masses, skin changes or abnormal secretions   Abdomen:   Auscultation/Inspection/Palpation: No tenderness or masses. Soft, nondistended   Inspection of incision site: well healed, clean, dry, intact and non-tender, no SOI  Hernia: no palpable hernia         Assessment/Plan:  1. Postpartum exam  Patient doing well  Diet, exercise encouraged  May return to normal activity  Multivitamin    2. Encounter for contraceptive management  Discussed with patient contraceptive options including natural family planning, barrier, oral contraceptives, patch, NuvaRing, Depo-Provera, Nexplanon, IUDs, " abstinence.       Safe sex education     Discussed with patient that birth control options discussed above do not protect against STDs.    Patient declines  Advised to call if desires    3. Postpartum depression  Educated  Counseling information given  HI & SI precautions  No medication prior to pregnancy  Self discontinued to Buspar secondary to not remembering twice a day  Desires medication  Rx Zoloft 25 mg daily x3 days then increased to BID  RTC 1 week    Lactation consult and education on donor breast milk         This note was transcribed by Brisa Lundberg MA. There may be transcription errors as a result, however minimal. I agree with transcription and every effort has been made to ensure accuracy of transcription, but any obvious errors or omissions should be clarified with the author of the document.               [1]   Current Outpatient Medications:     hydrOXYzine pamoate (VISTARIL) 50 MG Cap, Take 1 capsule (50 mg total) by mouth every 6 (six) hours as needed (anxiety)., Disp: 30 capsule, Rfl: 1    sertraline (ZOLOFT) 25 MG tablet, Take 1 tablet (25 mg total) by mouth once daily., Disp: 60 tablet, Rfl: 1

## 2025-05-28 ENCOUNTER — POSTPARTUM VISIT (OUTPATIENT)
Dept: OBSTETRICS AND GYNECOLOGY | Facility: CLINIC | Age: 25
End: 2025-05-28
Payer: COMMERCIAL

## 2025-05-28 VITALS
BODY MASS INDEX: 53.37 KG/M2 | SYSTOLIC BLOOD PRESSURE: 124 MMHG | HEIGHT: 62 IN | DIASTOLIC BLOOD PRESSURE: 80 MMHG | WEIGHT: 290 LBS

## 2025-05-28 DIAGNOSIS — Z30.9 ENCOUNTER FOR CONTRACEPTIVE MANAGEMENT, UNSPECIFIED TYPE: ICD-10-CM

## 2025-05-28 RX ORDER — SERTRALINE HYDROCHLORIDE 25 MG/1
25 TABLET, FILM COATED ORAL DAILY
Qty: 60 TABLET | Refills: 1 | Status: SHIPPED | OUTPATIENT
Start: 2025-05-28 | End: 2026-05-28

## 2025-06-16 NOTE — PROGRESS NOTES
Chief Complaint:  Follow-up (F/u Zoloft)    History of Present Illness:  Patient is a 24 y.o.  presents to follow up Zoloft 25 mg BID secondary to postpartum depression and anxiety. States Zoloft was working well twice a day however was having difficulty sleeping so she decreased to only taking in the morning. Mood is good but desires to go back to 50 mg daily. Denies HI/SI. Able to care for self and infant.    Pumping has improved following lactation consult.         Gyn History:  Menstrual History  Cycle: Yes (06/10/2025)  Menarche Age: 12 years  Flow Duration: 6  Flow: (!) Heavy (heavy x2 than light)  Interval: 28  Intermenstrual Bleeding: No  Dysmenorrhea: Yes  Dysmenorrhea Severity : (!) Severe    Menopause  Menopause Age: 1 years    Pap History  Last pap date: 24  Result: Normal  History of Abnormal Pap: No  HPV Vaccine Completed: No (0/3)    [place]  Sexually Active: No  STI History: No  Contraception: No    Breast History  Last Breast Imaging Date: No  History of Breast Biopsy: No      Review of systems:  General/Constitutional: Chills denies. Fatigue/weakness denies. Fever denies. Night sweats denies. Hot flashes denies  Breast: Dimpling denies. Breast mass denies. Breast pain/tenderness denies. Nipple discharge denies. Puckering denies.  Gastrointestinal: Abdominal pain denies. Blood in stool denies. Constipation denies. Diarrhea denies. Heartburn denies. Nausea denies. Vomiting denies   Genitourinary: Incontinence denies. Blood in urine denies. Frequent urination denies. Urgency denies. Painful urination denies. Nocturia denies   Gynecologic: Irregular menses denies. Heavy bleeding denies. Painful menses denies. Vaginal discharge denies. Vaginal odor denies. Vaginal itching/Irritation denies. Vaginal lesion denies.  Pelvic pain denies. Decreased libido denies. Vulvar lesion denies. Prolapse of genital organs denies. Painful intercourse denies. Postcoital bleeding denies   Psychiatric:  "Mood lability denies. Depressed mood denies. Suicidal thoughts denies. Anxiety denies. Overwhelmed denies. Appetite normal. Energy level normal.     OB History    Para Term  AB Living   2 1 1 0 1 1   SAB IAB Ectopic Multiple Live Births   1 0 0 0 1      # 1 - Date: 24, Sex: None, Weight: None, GA: 6w0d, Type: Spontaneous , Apgar1: None, Apgar5: None, Living: None, Birth Comments: None    # 2 - Date: 25, Sex: Male, Weight: 3.7 kg (8 lb 2.5 oz), GA: 38w4d, Type: , Low Transverse, Apgar1: 9, Apgar5: 9, Living: Living, Birth Comments: None      Past Medical History:   Diagnosis Date    Anemia     Anxiety disorder, unspecified     Depression      Current Medications[1]      Physical Exam:  /76 (BP Location: Left arm, Patient Position: Sitting)   Ht 5' 2" (1.575 m)   Wt 131.5 kg (290 lb)   LMP 06/10/2025   Breastfeeding Yes   BMI 53.04 kg/m²     Constitutional: General appearance: healthy, well-nourished and well-developed   Psychiatric:  Orientation to time, place and person. Normal mood and affect and active, alert       Assessment/Plan:  1. Postpartum depression   Educated  Counseling information given  HI & SI precautions  No medication prior to pregnancy  Difficulty sleeping with evening Zoloft  Increase Zoloft to 50 mg daily  RTC 3 weeks    Pumping has improved following lactation consult        This note was transcribed by Brisa Lundberg MA. There may be transcription errors as a result, however minimal. I agree with transcription and every effort has been made to ensure accuracy of transcription, but any obvious errors or omissions should be clarified with the author of the document.             [1]   Current Outpatient Medications:     hydrOXYzine pamoate (VISTARIL) 50 MG Cap, Take 1 capsule (50 mg total) by mouth every 6 (six) hours as needed (anxiety)., Disp: 30 capsule, Rfl: 1    "

## 2025-06-18 ENCOUNTER — OFFICE VISIT (OUTPATIENT)
Dept: OBSTETRICS AND GYNECOLOGY | Facility: CLINIC | Age: 25
End: 2025-06-18
Payer: COMMERCIAL

## 2025-06-18 VITALS
SYSTOLIC BLOOD PRESSURE: 120 MMHG | WEIGHT: 290 LBS | DIASTOLIC BLOOD PRESSURE: 76 MMHG | HEIGHT: 62 IN | BODY MASS INDEX: 53.37 KG/M2

## 2025-06-18 PROCEDURE — 3078F DIAST BP <80 MM HG: CPT | Mod: CPTII,,, | Performed by: OBSTETRICS & GYNECOLOGY

## 2025-06-18 PROCEDURE — 99213 OFFICE O/P EST LOW 20 MIN: CPT | Mod: ,,, | Performed by: OBSTETRICS & GYNECOLOGY

## 2025-06-18 PROCEDURE — 3074F SYST BP LT 130 MM HG: CPT | Mod: CPTII,,, | Performed by: OBSTETRICS & GYNECOLOGY

## 2025-06-18 PROCEDURE — 1159F MED LIST DOCD IN RCRD: CPT | Mod: CPTII,,, | Performed by: OBSTETRICS & GYNECOLOGY

## 2025-06-18 PROCEDURE — 3008F BODY MASS INDEX DOCD: CPT | Mod: CPTII,,, | Performed by: OBSTETRICS & GYNECOLOGY

## 2025-06-18 RX ORDER — SERTRALINE HYDROCHLORIDE 50 MG/1
50 TABLET, FILM COATED ORAL DAILY
Qty: 30 TABLET | Refills: 1 | Status: SHIPPED | OUTPATIENT
Start: 2025-06-18 | End: 2026-06-18

## 2025-07-10 NOTE — PROGRESS NOTES
Chief Complaint:  f/u zoloft 50mg    History of Present Illness:  Patient is a 24 y.o.  presents to follow up pn Zoloft 50mg secondary to postpartum depression and anxiety. Patient reports improvement and is content. Denies SI/HI, mood is stable, and able to care for self and infant. Patient states that she has recently switched from breastfeeding/pumping to formula about 2 weeks ago. Patient reports improvement with mood following this change as well. Desires to continue with no complaints.     Previously was on Zoloft 25mg BID. Reported that at the time, the medication was working well, but was still having difficulty sleeping. Reported only taking in the morning. Mood was stable but desired to go back to 50mg daily. Denied HI/SI and was able to care for self and infant.       Gyn History:  Menstrual History  Cycle: Yes  Menarche Age: 12 years  Flow Duration: 6  Flow: (!) Heavy  Interval: 28  Intermenstrual Bleeding: No  Dysmenorrhea: Yes  Dysmenorrhea Severity : (!) Severe    Menopause  Menopause Age: 1 years    Pap History  Last pap date: 24  Result: Normal  History of Abnormal Pap: No  HPV Vaccine Completed: No    [place]  Sexually Active: No  STI History: No  Contraception: No    Breast History  Last Breast Imaging Date: No  History of Breast Biopsy: No      Review of systems:  General/Constitutional: Chills denies. Fatigue/weakness denies. Fever denies. Night sweats denies. Hot flashes denies  Breast: Dimpling denies. Breast mass denies. Breast pain/tenderness denies. Nipple discharge denies. Puckering denies.  Gastrointestinal: Abdominal pain denies. Blood in stool denies. Constipation denies. Diarrhea denies. Heartburn denies. Nausea denies. Vomiting denies   Genitourinary: Incontinence denies. Blood in urine denies. Frequent urination denies. Urgency denies. Painful urination denies. Nocturia denies   Gynecologic: Irregular menses denies. Heavy bleeding denies. Painful menses denies.  "Vaginal discharge denies. Vaginal odor denies. Vaginal itching/Irritation denies. Vaginal lesion denies.  Pelvic pain denies. Decreased libido denies. Vulvar lesion denies. Prolapse of genital organs denies. Painful intercourse denies. Postcoital bleeding denies   Psychiatric: Mood lability denies. Depressed mood denies. Suicidal thoughts denies. Anxiety denies. Overwhelmed denies. Appetite normal. Energy level normal.     OB History    Para Term  AB Living   2 1 1 0 1 1   SAB IAB Ectopic Multiple Live Births   1 0 0 0 1      # 1 - Date: 24, Sex: None, Weight: None, GA: 6w0d, Type: Spontaneous , Apgar1: None, Apgar5: None, Living: None, Birth Comments: None    # 2 - Date: 25, Sex: Male, Weight: 3.7 kg (8 lb 2.5 oz), GA: 38w4d, Type: , Low Transverse, Apgar1: 9, Apgar5: 9, Living: Living, Birth Comments: None      Past Medical History:   Diagnosis Date    Anemia     Anxiety disorder, unspecified     Depression      Current Medications[1]      Physical Exam:  /76   Temp 96.8 °F (36 °C)   Ht 5' 2" (1.575 m)   Wt 132.5 kg (292 lb)   LMP 2025   Breastfeeding No   BMI 53.41 kg/m²     Constitutional: General appearance: healthy, well-nourished and well-developed   Psychiatric:  Orientation to time, place and person. Normal mood and affect and active, alert       Assessment/Plan:  1. Postpartum depression  Educated  Counseling information given  HI & SI precautions  No medication prior to pregnancy  Improvement with recent increase of Zoloft 50mg daily  Desires to continue  Rx: Zoloft 50mg daily  Will f/u at annual     RTC prn/annual        Future Appointments   Date Time Provider Department Center   10/2/2025  9:00 AM Geri Noriega MD [facility]       This note was transcribed by Sarah Garcia. There may be transcription errors as a result, however minimal. Effort has been made to ensure accuracy of transcription, but any obvious errors or " omissions should be clarified with the author of the document.              [1]   Current Outpatient Medications:     sertraline (ZOLOFT) 50 MG tablet, Take 1 tablet (50 mg total) by mouth once daily., Disp: 30 tablet, Rfl: 1

## 2025-07-17 ENCOUNTER — OFFICE VISIT (OUTPATIENT)
Dept: OBSTETRICS AND GYNECOLOGY | Facility: CLINIC | Age: 25
End: 2025-07-17
Payer: COMMERCIAL

## 2025-07-17 VITALS
WEIGHT: 292 LBS | SYSTOLIC BLOOD PRESSURE: 120 MMHG | TEMPERATURE: 97 F | BODY MASS INDEX: 53.73 KG/M2 | HEIGHT: 62 IN | DIASTOLIC BLOOD PRESSURE: 76 MMHG

## 2025-07-17 PROCEDURE — 1159F MED LIST DOCD IN RCRD: CPT | Mod: CPTII,,, | Performed by: OBSTETRICS & GYNECOLOGY

## 2025-07-17 PROCEDURE — 3008F BODY MASS INDEX DOCD: CPT | Mod: CPTII,,, | Performed by: OBSTETRICS & GYNECOLOGY

## 2025-07-17 PROCEDURE — 3074F SYST BP LT 130 MM HG: CPT | Mod: CPTII,,, | Performed by: OBSTETRICS & GYNECOLOGY

## 2025-07-17 PROCEDURE — 99213 OFFICE O/P EST LOW 20 MIN: CPT | Mod: ,,, | Performed by: OBSTETRICS & GYNECOLOGY

## 2025-07-17 PROCEDURE — 3078F DIAST BP <80 MM HG: CPT | Mod: CPTII,,, | Performed by: OBSTETRICS & GYNECOLOGY

## 2025-08-20 RX ORDER — SERTRALINE HYDROCHLORIDE 50 MG/1
50 TABLET, FILM COATED ORAL DAILY
Qty: 30 TABLET | Refills: 1 | Status: SHIPPED | OUTPATIENT
Start: 2025-08-20

## (undated) DEVICE — SUT VICRYL 3-0 CTX 36IN

## (undated) DEVICE — SYR 10CC LUER LOCK

## (undated) DEVICE — SPONGE LAP 18X18 PREWASHED

## (undated) DEVICE — PAD SURFACE PREP 17

## (undated) DEVICE — PACK C-SECTION CUSTOM

## (undated) DEVICE — UNDERPAD ULTRASORB 300LB 30X36

## (undated) DEVICE — DRAPE HALF SURGICAL 40X58IN

## (undated) DEVICE — KIT PREVENA INCISION MGMT 13CM

## (undated) DEVICE — CHLORAPREP W TINT 26ML APPL

## (undated) DEVICE — SUT 0 27IN CHROMIC GUT CT-1

## (undated) DEVICE — GOWN POLY REINF BRTH SLV 2XL

## (undated) DEVICE — TRAY CATH URETHRAL FOLEY 16FR

## (undated) DEVICE — TOWEL OR DISP STRL BLUE 4/PK

## (undated) DEVICE — SUT PDS II 96IN XLH TAPER

## (undated) DEVICE — STAPLER SKIN PROXIMATE WIDE

## (undated) DEVICE — DRAPE CESAREAN 98X123.5X77 STR

## (undated) DEVICE — SUT MONOCRYL CT 0 36IN

## (undated) DEVICE — ADHESIVE DERMABOND ADVANCED

## (undated) DEVICE — SUT CHROMIC 2-0 SH 27IN BRN

## (undated) DEVICE — RETRACTOR TRAXI PANNICULUS

## (undated) DEVICE — GLOVE PROTEXIS PI SYN SURG 8.5